# Patient Record
Sex: MALE | Race: BLACK OR AFRICAN AMERICAN | NOT HISPANIC OR LATINO | URBAN - METROPOLITAN AREA
[De-identification: names, ages, dates, MRNs, and addresses within clinical notes are randomized per-mention and may not be internally consistent; named-entity substitution may affect disease eponyms.]

---

## 2020-07-31 ENCOUNTER — OUTPATIENT (OUTPATIENT)
Dept: OUTPATIENT SERVICES | Facility: HOSPITAL | Age: 50
LOS: 1 days | Discharge: HOME | End: 2020-07-31
Payer: MEDICARE

## 2020-07-31 VITALS
RESPIRATION RATE: 14 BRPM | WEIGHT: 238.1 LBS | DIASTOLIC BLOOD PRESSURE: 76 MMHG | HEIGHT: 69 IN | OXYGEN SATURATION: 98 % | SYSTOLIC BLOOD PRESSURE: 128 MMHG | HEART RATE: 66 BPM | TEMPERATURE: 98 F

## 2020-07-31 DIAGNOSIS — S83.242D OTHER TEAR OF MEDIAL MENISCUS, CURRENT INJURY, LEFT KNEE, SUBSEQUENT ENCOUNTER: ICD-10-CM

## 2020-07-31 DIAGNOSIS — Z01.818 ENCOUNTER FOR OTHER PREPROCEDURAL EXAMINATION: ICD-10-CM

## 2020-07-31 DIAGNOSIS — I10 ESSENTIAL (PRIMARY) HYPERTENSION: ICD-10-CM

## 2020-07-31 DIAGNOSIS — Z98.890 OTHER SPECIFIED POSTPROCEDURAL STATES: Chronic | ICD-10-CM

## 2020-07-31 LAB
ANION GAP SERPL CALC-SCNC: 4 MMOL/L — LOW (ref 5–17)
BASOPHILS # BLD AUTO: 0.03 K/UL — SIGNIFICANT CHANGE UP (ref 0–0.2)
BASOPHILS NFR BLD AUTO: 0.6 % — SIGNIFICANT CHANGE UP (ref 0–2)
BUN SERPL-MCNC: 17 MG/DL — SIGNIFICANT CHANGE UP (ref 7–23)
CALCIUM SERPL-MCNC: 8.6 MG/DL — SIGNIFICANT CHANGE UP (ref 8.5–10.1)
CHLORIDE SERPL-SCNC: 108 MMOL/L — SIGNIFICANT CHANGE UP (ref 96–108)
CO2 SERPL-SCNC: 31 MMOL/L — SIGNIFICANT CHANGE UP (ref 22–31)
CREAT SERPL-MCNC: 1.13 MG/DL — SIGNIFICANT CHANGE UP (ref 0.5–1.3)
EOSINOPHIL # BLD AUTO: 0.11 K/UL — SIGNIFICANT CHANGE UP (ref 0–0.5)
EOSINOPHIL NFR BLD AUTO: 2.2 % — SIGNIFICANT CHANGE UP (ref 0–6)
GLUCOSE SERPL-MCNC: 114 MG/DL — HIGH (ref 70–99)
HCT VFR BLD CALC: 42.6 % — SIGNIFICANT CHANGE UP (ref 39–50)
HGB BLD-MCNC: 13.5 G/DL — SIGNIFICANT CHANGE UP (ref 13–17)
IMM GRANULOCYTES NFR BLD AUTO: 0.2 % — SIGNIFICANT CHANGE UP (ref 0–1.5)
LYMPHOCYTES # BLD AUTO: 1.86 K/UL — SIGNIFICANT CHANGE UP (ref 1–3.3)
LYMPHOCYTES # BLD AUTO: 37.7 % — SIGNIFICANT CHANGE UP (ref 13–44)
MCHC RBC-ENTMCNC: 26.7 PG — LOW (ref 27–34)
MCHC RBC-ENTMCNC: 31.7 GM/DL — LOW (ref 32–36)
MCV RBC AUTO: 84.2 FL — SIGNIFICANT CHANGE UP (ref 80–100)
MONOCYTES # BLD AUTO: 0.41 K/UL — SIGNIFICANT CHANGE UP (ref 0–0.9)
MONOCYTES NFR BLD AUTO: 8.3 % — SIGNIFICANT CHANGE UP (ref 2–14)
NEUTROPHILS # BLD AUTO: 2.51 K/UL — SIGNIFICANT CHANGE UP (ref 1.8–7.4)
NEUTROPHILS NFR BLD AUTO: 51 % — SIGNIFICANT CHANGE UP (ref 43–77)
NRBC # BLD: 0 /100 WBCS — SIGNIFICANT CHANGE UP (ref 0–0)
PLATELET # BLD AUTO: 242 K/UL — SIGNIFICANT CHANGE UP (ref 150–400)
POTASSIUM SERPL-MCNC: 3.4 MMOL/L — LOW (ref 3.5–5.3)
POTASSIUM SERPL-SCNC: 3.4 MMOL/L — LOW (ref 3.5–5.3)
RBC # BLD: 5.06 M/UL — SIGNIFICANT CHANGE UP (ref 4.2–5.8)
RBC # FLD: 13.9 % — SIGNIFICANT CHANGE UP (ref 10.3–14.5)
SODIUM SERPL-SCNC: 143 MMOL/L — SIGNIFICANT CHANGE UP (ref 135–145)
WBC # BLD: 4.93 K/UL — SIGNIFICANT CHANGE UP (ref 3.8–10.5)
WBC # FLD AUTO: 4.93 K/UL — SIGNIFICANT CHANGE UP (ref 3.8–10.5)

## 2020-07-31 PROCEDURE — 93010 ELECTROCARDIOGRAM REPORT: CPT

## 2020-07-31 RX ORDER — SODIUM CHLORIDE 9 MG/ML
3 INJECTION INTRAMUSCULAR; INTRAVENOUS; SUBCUTANEOUS EVERY 8 HOURS
Refills: 0 | Status: DISCONTINUED | OUTPATIENT
Start: 2020-07-31 | End: 2020-07-31

## 2020-07-31 RX ORDER — SODIUM CHLORIDE 9 MG/ML
1000 INJECTION, SOLUTION INTRAVENOUS
Refills: 0 | Status: DISCONTINUED | OUTPATIENT
Start: 2020-08-10 | End: 2020-08-25

## 2020-07-31 RX ORDER — SODIUM CHLORIDE 9 MG/ML
1000 INJECTION, SOLUTION INTRAVENOUS
Refills: 0 | Status: DISCONTINUED | OUTPATIENT
Start: 2020-07-31 | End: 2020-07-31

## 2020-07-31 RX ORDER — SODIUM CHLORIDE 9 MG/ML
3 INJECTION INTRAMUSCULAR; INTRAVENOUS; SUBCUTANEOUS EVERY 8 HOURS
Refills: 0 | Status: DISCONTINUED | OUTPATIENT
Start: 2020-08-10 | End: 2020-08-25

## 2020-07-31 NOTE — H&P PST ADULT - HISTORY OF PRESENT ILLNESS
51yo male with medical h/o work-related injury to left knee and pt presents today for PST for Left Knee Arthroscopy scheduled for 8/10/2020

## 2020-07-31 NOTE — H&P PST ADULT - NEGATIVE CARDIOVASCULAR SYMPTOMS
no peripheral edema/no chest pain/no palpitations/no paroxysmal nocturnal dyspnea/no claudication/no orthopnea/no dyspnea on exertion

## 2020-07-31 NOTE — H&P PST ADULT - NSANTHOSAYNRD_GEN_A_CORE
No. GRICEL screening performed.  STOP BANG Legend: 0-2 = LOW Risk; 3-4 = INTERMEDIATE Risk; 5-8 = HIGH Risk

## 2020-07-31 NOTE — H&P PST ADULT - NSICDXFAMILYHX_GEN_ALL_CORE_FT
FAMILY HISTORY:  Mother  Still living? Yes, Estimated age: Age Unknown  Family history of cancer, Age at diagnosis: Age Unknown  Family history of diabetes mellitus, Age at diagnosis: Age Unknown    Sibling  Still living? Unknown  Family history of cancer, Age at diagnosis: Age Unknown  Family history of diabetes mellitus, Age at diagnosis: Age Unknown

## 2020-07-31 NOTE — H&P PST ADULT - NEGATIVE MUSCULOSKELETAL SYMPTOMS
no joint swelling/no arthralgia/no arm pain L/no arm pain R/no muscle weakness/no neck pain/no myalgia/no muscle cramps/no leg pain R

## 2020-07-31 NOTE — H&P PST ADULT - MUSCULOSKELETAL
details… detailed exam diminished strength/no joint swelling/no joint erythema/no calf tenderness/decreased ROM due to pain/no joint warmth

## 2020-07-31 NOTE — H&P PST ADULT - NSICDXPROBLEM_GEN_ALL_CORE_FT
PROBLEM DIAGNOSES  Problem: Other tear of medial meniscus, current injury, left knee, subsequent encounter  Assessment and Plan: Pre-op instructions given. Pt verbalized understanding  Chlorhexidine wash instructions given  Pending: Covid19 results, test scheduled for 8/7    Problem: Hypertension  Assessment and Plan: GRICEL precaution - stop bang 3

## 2020-08-07 ENCOUNTER — OUTPATIENT (OUTPATIENT)
Dept: OUTPATIENT SERVICES | Facility: HOSPITAL | Age: 50
LOS: 1 days | Discharge: HOME | End: 2020-08-07

## 2020-08-07 DIAGNOSIS — Z01.818 ENCOUNTER FOR OTHER PREPROCEDURAL EXAMINATION: ICD-10-CM

## 2020-08-07 DIAGNOSIS — Z98.890 OTHER SPECIFIED POSTPROCEDURAL STATES: Chronic | ICD-10-CM

## 2020-08-07 LAB — SARS-COV-2 RNA SPEC QL NAA+PROBE: SIGNIFICANT CHANGE UP

## 2020-08-09 ENCOUNTER — TRANSCRIPTION ENCOUNTER (OUTPATIENT)
Age: 50
End: 2020-08-09

## 2020-08-10 ENCOUNTER — OUTPATIENT (OUTPATIENT)
Dept: OUTPATIENT SERVICES | Facility: HOSPITAL | Age: 50
LOS: 1 days | Discharge: HOME | End: 2020-08-10

## 2020-08-10 VITALS
DIASTOLIC BLOOD PRESSURE: 74 MMHG | SYSTOLIC BLOOD PRESSURE: 126 MMHG | TEMPERATURE: 98 F | RESPIRATION RATE: 14 BRPM | HEART RATE: 75 BPM | OXYGEN SATURATION: 96 %

## 2020-08-10 VITALS
HEIGHT: 69 IN | DIASTOLIC BLOOD PRESSURE: 87 MMHG | TEMPERATURE: 99 F | HEART RATE: 74 BPM | RESPIRATION RATE: 18 BRPM | WEIGHT: 238.1 LBS | SYSTOLIC BLOOD PRESSURE: 130 MMHG | OXYGEN SATURATION: 97 %

## 2020-08-10 DIAGNOSIS — Z98.890 OTHER SPECIFIED POSTPROCEDURAL STATES: Chronic | ICD-10-CM

## 2020-08-10 RX ORDER — SODIUM CHLORIDE 9 MG/ML
1000 INJECTION, SOLUTION INTRAVENOUS
Refills: 0 | Status: DISCONTINUED | OUTPATIENT
Start: 2020-08-10 | End: 2020-08-10

## 2020-08-10 RX ORDER — ONDANSETRON 8 MG/1
1 TABLET, FILM COATED ORAL
Qty: 10 | Refills: 0
Start: 2020-08-10 | End: 2020-08-12

## 2020-08-10 RX ORDER — HYDROMORPHONE HYDROCHLORIDE 2 MG/ML
0.5 INJECTION INTRAMUSCULAR; INTRAVENOUS; SUBCUTANEOUS
Refills: 0 | Status: DISCONTINUED | OUTPATIENT
Start: 2020-08-10 | End: 2020-08-10

## 2020-08-10 RX ORDER — OXYCODONE AND ACETAMINOPHEN 5; 325 MG/1; MG/1
1 TABLET ORAL
Qty: 20 | Refills: 0
Start: 2020-08-10 | End: 2020-08-12

## 2020-08-10 RX ADMIN — SODIUM CHLORIDE 30 MILLILITER(S): 9 INJECTION, SOLUTION INTRAVENOUS at 17:23

## 2020-08-10 NOTE — ASU DISCHARGE PLAN (ADULT/PEDIATRIC) - CARE PROVIDER_API CALL
Hardeep Alvarado  ORTHOPAEDIC SURGERY  [address] [place] [address]  Phone: [phone]  Fax: [phone]  Follow Up Time:

## 2020-08-10 NOTE — ASU DISCHARGE PLAN (ADULT/PEDIATRIC) - ASU DC SPECIAL INSTRUCTIONSFT
Knee Arthroscopy Instructions    1) Your knee will swell over the next 48-72 hours and you can expect pain to get a bit worse. Ice your knee plenty, continuously if possible. Fill up a plastic bag, then wrap it in a towel or pillow case.     2) Elevate your leg above your heart with about 3 pillows when you can, when you are in bed or chair. Otherwise you should be up and about, walking as much as you can tolerate. The more you move the better you will do. Weight bearing as tolerated.    3) BANDAGE: Expect some mild bloody drainage. It is normal. It may soak through the gauze and ACE bandage. This is mostly leftover Saline fluid coming out of your knee from surgery. Just place another gauze and ACE over it and wrap it snug but not overly tight. If it bleeds through the second bandage again, call.    4) SHOWER: Remove bandage in 72 hours and place waterproof band aides. You can shower in 72 hours. No bath. Pat your incisions dry. No creams or lotions.    5) Only reason to worry would be if pain got so severe that you cannot feel or wiggle your toes, or if your foot is cold. In this case you need to call or go to the Emergency Room. But as long as you can feel and wiggle your toes, you are fine.    6) Call the office to schedule a follow up appointment to be seen in 7-10 days. Your Sutures will be removed at that point.    7) A pain Rx is in the chart; fill it on your way home. OR was sent electronically to your pharmacy, pick it up on the way home.

## 2020-08-14 DIAGNOSIS — M67.52 PLICA SYNDROME, LEFT KNEE: ICD-10-CM

## 2020-08-14 DIAGNOSIS — I10 ESSENTIAL (PRIMARY) HYPERTENSION: ICD-10-CM

## 2020-08-14 DIAGNOSIS — M23.232 DERANGEMENT OF OTHER MEDIAL MENISCUS DUE TO OLD TEAR OR INJURY, LEFT KNEE: ICD-10-CM

## 2020-08-14 DIAGNOSIS — M94.262 CHONDROMALACIA, LEFT KNEE: ICD-10-CM

## 2020-12-04 ENCOUNTER — APPOINTMENT (OUTPATIENT)
Dept: ORTHOPEDIC SURGERY | Facility: CLINIC | Age: 50
End: 2020-12-04

## 2020-12-04 PROBLEM — L80 VITILIGO: Chronic | Status: ACTIVE | Noted: 2020-08-10

## 2020-12-07 ENCOUNTER — APPOINTMENT (OUTPATIENT)
Dept: ORTHOPEDIC SURGERY | Facility: CLINIC | Age: 50
End: 2020-12-07
Payer: COMMERCIAL

## 2020-12-07 VITALS — WEIGHT: 238 LBS | BODY MASS INDEX: 35.25 KG/M2 | HEIGHT: 69 IN

## 2020-12-07 DIAGNOSIS — Z80.0 FAMILY HISTORY OF MALIGNANT NEOPLASM OF DIGESTIVE ORGANS: ICD-10-CM

## 2020-12-07 DIAGNOSIS — Z78.9 OTHER SPECIFIED HEALTH STATUS: ICD-10-CM

## 2020-12-07 DIAGNOSIS — Z80.49 FAMILY HISTORY OF MALIGNANT NEOPLASM OF OTHER GENITAL ORGANS: ICD-10-CM

## 2020-12-07 DIAGNOSIS — M51.36 OTHER INTERVERTEBRAL DISC DEGENERATION, LUMBAR REGION: ICD-10-CM

## 2020-12-07 DIAGNOSIS — Z80.1 FAMILY HISTORY OF MALIGNANT NEOPLASM OF TRACHEA, BRONCHUS AND LUNG: ICD-10-CM

## 2020-12-07 DIAGNOSIS — M47.817 SPONDYLOSIS W/OUT MYELOPATHY OR RADICULOPATHY, LUMBOSACRAL REGION: ICD-10-CM

## 2020-12-07 DIAGNOSIS — Z86.79 PERSONAL HISTORY OF OTHER DISEASES OF THE CIRCULATORY SYSTEM: ICD-10-CM

## 2020-12-07 PROCEDURE — 99214 OFFICE O/P EST MOD 30 MIN: CPT

## 2020-12-07 PROCEDURE — 99072 ADDL SUPL MATRL&STAF TM PHE: CPT

## 2020-12-07 PROCEDURE — 72100 X-RAY EXAM L-S SPINE 2/3 VWS: CPT

## 2020-12-07 RX ORDER — OXYCODONE AND ACETAMINOPHEN 5; 325 MG/1; MG/1
5-325 TABLET ORAL
Qty: 20 | Refills: 0 | Status: ACTIVE | COMMUNITY
Start: 2020-09-02

## 2020-12-07 RX ORDER — NAPROXEN 500 MG/1
500 TABLET ORAL
Qty: 20 | Refills: 0 | Status: ACTIVE | COMMUNITY
Start: 2020-09-02

## 2020-12-07 RX ORDER — MELOXICAM 15 MG/1
15 TABLET ORAL
Qty: 30 | Refills: 1 | Status: ACTIVE | COMMUNITY
Start: 2020-12-07 | End: 1900-01-01

## 2020-12-22 ENCOUNTER — APPOINTMENT (OUTPATIENT)
Dept: ORTHOPEDIC SURGERY | Facility: CLINIC | Age: 50
End: 2020-12-22
Payer: COMMERCIAL

## 2020-12-22 VITALS — TEMPERATURE: 97.3 F

## 2020-12-22 DIAGNOSIS — M51.26 OTHER INTERVERTEBRAL DISC DISPLACEMENT, LUMBAR REGION: ICD-10-CM

## 2020-12-22 PROCEDURE — 99214 OFFICE O/P EST MOD 30 MIN: CPT

## 2020-12-22 PROCEDURE — 99072 ADDL SUPL MATRL&STAF TM PHE: CPT

## 2021-01-13 DIAGNOSIS — Z00.00 ENCOUNTER FOR GENERAL ADULT MEDICAL EXAMINATION W/OUT ABNORMAL FINDINGS: ICD-10-CM

## 2021-01-14 ENCOUNTER — APPOINTMENT (OUTPATIENT)
Dept: ORTHOPEDIC SURGERY | Facility: CLINIC | Age: 51
End: 2021-01-14
Payer: COMMERCIAL

## 2021-01-14 DIAGNOSIS — M70.62 TROCHANTERIC BURSITIS, LEFT HIP: ICD-10-CM

## 2021-01-14 PROCEDURE — 99072 ADDL SUPL MATRL&STAF TM PHE: CPT

## 2021-01-14 PROCEDURE — 99214 OFFICE O/P EST MOD 30 MIN: CPT

## 2021-01-14 NOTE — DISCUSSION/SUMMARY
[de-identified] : The patient has GTB of the left hip. They are not an appropriate candidate for surgical intervention at this time. An extensive discussion was conducted on the natural history of the disease and the variety of surgical and non-surgical options available to the patient including, but not limited to non-steroidal anti-inflammatory medications, steroid injections, physical therapy, maintenance of ideal body weight, and reduction of activity. I recommended and prescribed a course of Mobic and physical therapy. The patient will schedule an appointment as needed. He may return to work, full duty without restrictions with regards to his left hip.

## 2021-01-14 NOTE — HISTORY OF PRESENT ILLNESS
[de-identified] : This is a very nice  50 year old  male experiencing pain in the lateral aspect of the left hip. He denies groin pain.  He did have a workers comp injury to the left knee which was treated by Dr. FERNANDES which is mild in intensity and has been going on for months now. He is also being treated by Dr. Ritter for his back for lumbar degenerative disc disease. He was treated with an epidural lumbar injection which he states is helping the hip. He works for the [employer] as is hoping to be cleared back to work.  The patient denies any radiation of the pain to the feet and it is not associated with numbness, tingling, or weakness.  Does not require cane or walker for ambulation.  Has not tried physical therapy for this.

## 2021-01-14 NOTE — PHYSICAL EXAM
[de-identified] : Patient is well nourished, well-developed, in no acute distress, with appropriate mood and affect. The patient is oriented to time, place, and person. Respirations are even and unlabored. Gait evaluation does not reveal a limp. There is no inguinal adenopathy. Examination of the contralateral hip shows normal range of motion, strength, no tenderness, and intact skin. The affected limb is well-perfused and showed 2+ dp/pt pulses, without skin lesions, shows a grossly normal motor and sensory examination. Examination of the hip shows no skin lesions. Hip motion is full and painless throughout ROM. Leg lengths are approximately equal. FADIR is negative and MARY is negative. Stinchfield test is negative. Both hips are stable and muscle strength is normal with good strength with resisted abduction and adduction. Pedal pulses are palpable.  Tender to palpation over the lateral aspect of the hip. [de-identified] : AP and lateral x-rays of the left hip, pelvis, and femur were brought in by the patient and does not show any arthritic changes to the joint. There is satisfactory joint space. There is a small calcification noted at the superior acetabular rim.

## 2021-04-08 ENCOUNTER — TRANSCRIPTION ENCOUNTER (OUTPATIENT)
Age: 51
End: 2021-04-08

## 2021-04-10 ENCOUNTER — INPATIENT (INPATIENT)
Facility: HOSPITAL | Age: 51
LOS: 3 days | Discharge: HOME | End: 2021-04-14
Attending: INTERNAL MEDICINE | Admitting: INTERNAL MEDICINE
Payer: COMMERCIAL

## 2021-04-10 VITALS
HEART RATE: 80 BPM | HEIGHT: 69 IN | WEIGHT: 179.9 LBS | SYSTOLIC BLOOD PRESSURE: 118 MMHG | DIASTOLIC BLOOD PRESSURE: 74 MMHG | RESPIRATION RATE: 19 BRPM | OXYGEN SATURATION: 99 %

## 2021-04-10 DIAGNOSIS — Z98.890 OTHER SPECIFIED POSTPROCEDURAL STATES: Chronic | ICD-10-CM

## 2021-04-10 LAB
ALBUMIN SERPL ELPH-MCNC: 3.3 G/DL — SIGNIFICANT CHANGE UP (ref 3.3–5)
ALP SERPL-CCNC: 66 U/L — SIGNIFICANT CHANGE UP (ref 40–120)
ALT FLD-CCNC: 22 U/L — SIGNIFICANT CHANGE UP (ref 12–78)
ANION GAP SERPL CALC-SCNC: 7 MMOL/L — SIGNIFICANT CHANGE UP (ref 5–17)
APPEARANCE UR: CLEAR — SIGNIFICANT CHANGE UP
AST SERPL-CCNC: 26 U/L — SIGNIFICANT CHANGE UP (ref 15–37)
BACTERIA # UR AUTO: ABNORMAL
BASOPHILS # BLD AUTO: 0.01 K/UL — SIGNIFICANT CHANGE UP (ref 0–0.2)
BASOPHILS NFR BLD AUTO: 0.2 % — SIGNIFICANT CHANGE UP (ref 0–2)
BILIRUB SERPL-MCNC: 0.5 MG/DL — SIGNIFICANT CHANGE UP (ref 0.2–1.2)
BILIRUB UR-MCNC: NEGATIVE — SIGNIFICANT CHANGE UP
BUN SERPL-MCNC: 15 MG/DL — SIGNIFICANT CHANGE UP (ref 7–23)
CALCIUM SERPL-MCNC: 8 MG/DL — LOW (ref 8.5–10.1)
CHLORIDE SERPL-SCNC: 100 MMOL/L — SIGNIFICANT CHANGE UP (ref 96–108)
CK MB BLD-MCNC: <1 % — SIGNIFICANT CHANGE UP (ref 0–3.5)
CK MB CFR SERPL CALC: <1 NG/ML — SIGNIFICANT CHANGE UP (ref 0.5–3.6)
CK SERPL-CCNC: 100 U/L — SIGNIFICANT CHANGE UP (ref 26–308)
CO2 SERPL-SCNC: 33 MMOL/L — HIGH (ref 22–31)
COLOR SPEC: YELLOW — SIGNIFICANT CHANGE UP
CREAT SERPL-MCNC: 1.21 MG/DL — SIGNIFICANT CHANGE UP (ref 0.5–1.3)
D DIMER BLD IA.RAPID-MCNC: 254 NG/ML DDU — HIGH
DIFF PNL FLD: NEGATIVE — SIGNIFICANT CHANGE UP
EOSINOPHIL # BLD AUTO: 0 K/UL — SIGNIFICANT CHANGE UP (ref 0–0.5)
EOSINOPHIL NFR BLD AUTO: 0 % — SIGNIFICANT CHANGE UP (ref 0–6)
EPI CELLS # UR: SIGNIFICANT CHANGE UP
GLUCOSE SERPL-MCNC: 122 MG/DL — HIGH (ref 70–99)
GLUCOSE UR QL: NEGATIVE MG/DL — SIGNIFICANT CHANGE UP
HCT VFR BLD CALC: 40.5 % — SIGNIFICANT CHANGE UP (ref 39–50)
HGB BLD-MCNC: 12.9 G/DL — LOW (ref 13–17)
IMM GRANULOCYTES NFR BLD AUTO: 0.2 % — SIGNIFICANT CHANGE UP (ref 0–1.5)
INR BLD: 1.15 RATIO — SIGNIFICANT CHANGE UP (ref 0.88–1.16)
KETONES UR-MCNC: ABNORMAL
LEUKOCYTE ESTERASE UR-ACNC: NEGATIVE — SIGNIFICANT CHANGE UP
LYMPHOCYTES # BLD AUTO: 0.73 K/UL — LOW (ref 1–3.3)
LYMPHOCYTES # BLD AUTO: 16.3 % — SIGNIFICANT CHANGE UP (ref 13–44)
MCHC RBC-ENTMCNC: 26.8 PG — LOW (ref 27–34)
MCHC RBC-ENTMCNC: 31.9 GM/DL — LOW (ref 32–36)
MCV RBC AUTO: 84 FL — SIGNIFICANT CHANGE UP (ref 80–100)
MONOCYTES # BLD AUTO: 0.31 K/UL — SIGNIFICANT CHANGE UP (ref 0–0.9)
MONOCYTES NFR BLD AUTO: 6.9 % — SIGNIFICANT CHANGE UP (ref 2–14)
NEUTROPHILS # BLD AUTO: 3.42 K/UL — SIGNIFICANT CHANGE UP (ref 1.8–7.4)
NEUTROPHILS NFR BLD AUTO: 76.4 % — SIGNIFICANT CHANGE UP (ref 43–77)
NITRITE UR-MCNC: NEGATIVE — SIGNIFICANT CHANGE UP
NRBC # BLD: 0 /100 WBCS — SIGNIFICANT CHANGE UP (ref 0–0)
PH UR: 6.5 — SIGNIFICANT CHANGE UP (ref 5–8)
PLATELET # BLD AUTO: 125 K/UL — LOW (ref 150–400)
POTASSIUM SERPL-MCNC: 3.1 MMOL/L — LOW (ref 3.5–5.3)
POTASSIUM SERPL-SCNC: 3.1 MMOL/L — LOW (ref 3.5–5.3)
PROT SERPL-MCNC: 6.6 GM/DL — SIGNIFICANT CHANGE UP (ref 6–8.3)
PROT UR-MCNC: 30 MG/DL
PROTHROM AB SERPL-ACNC: 13.3 SEC — SIGNIFICANT CHANGE UP (ref 10.6–13.6)
RAPID RVP RESULT: DETECTED
RBC # BLD: 4.82 M/UL — SIGNIFICANT CHANGE UP (ref 4.2–5.8)
RBC # FLD: 13.2 % — SIGNIFICANT CHANGE UP (ref 10.3–14.5)
RBC CASTS # UR COMP ASSIST: SIGNIFICANT CHANGE UP /HPF (ref 0–4)
SARS-COV-2 RNA SPEC QL NAA+PROBE: DETECTED
SODIUM SERPL-SCNC: 140 MMOL/L — SIGNIFICANT CHANGE UP (ref 135–145)
SP GR SPEC: 1.01 — SIGNIFICANT CHANGE UP (ref 1.01–1.02)
TROPONIN I SERPL-MCNC: <.015 NG/ML — SIGNIFICANT CHANGE UP (ref 0.01–0.04)
UROBILINOGEN FLD QL: 4 MG/DL
WBC # BLD: 4.48 K/UL — SIGNIFICANT CHANGE UP (ref 3.8–10.5)
WBC # FLD AUTO: 4.48 K/UL — SIGNIFICANT CHANGE UP (ref 3.8–10.5)
WBC UR QL: ABNORMAL

## 2021-04-10 PROCEDURE — 71045 X-RAY EXAM CHEST 1 VIEW: CPT | Mod: 26

## 2021-04-10 PROCEDURE — 93010 ELECTROCARDIOGRAM REPORT: CPT

## 2021-04-10 PROCEDURE — 99285 EMERGENCY DEPT VISIT HI MDM: CPT

## 2021-04-10 RX ORDER — SODIUM CHLORIDE 9 MG/ML
1000 INJECTION INTRAMUSCULAR; INTRAVENOUS; SUBCUTANEOUS ONCE
Refills: 0 | Status: COMPLETED | OUTPATIENT
Start: 2021-04-10 | End: 2021-04-10

## 2021-04-10 NOTE — ED PROVIDER NOTE - ENMT, MLM
Airway patent, Nasal mucosa clear. Mouth with normal mucosa. Throat has no vesicles, no oropharyngeal exudates and uvula is midline. Airway patent, mouth with normal mucosa

## 2021-04-10 NOTE — ED ADULT NURSE NOTE - OBJECTIVE STATEMENT
Pt axox3 presents to the ED complaining of syncopal episode at home. Pt states that he passed out for about 15-20 seconds, not witnessed by family but heard. Pt not sure/doesn't remember if he hit his head. Denies eating the whole day. Denies headache, SOB, chest pain. Hx HTN. Denies etoh, smoking.

## 2021-04-10 NOTE — ED PROVIDER NOTE - OBJECTIVE STATEMENT
Pt is a 50 year old male w/PMH of HTN left knee and right shoulder surgery, who presents to the ED today s/p syncope. Pt was diagnosed COVID positive on Thursday from his daughter. Pt was walking to the fridge to get water feeling a little dizzy, and passed out. Per wife she heard him fall and saw that was propped up against the door when she found him. Pt looked as if he was having a seizure, and his upper body  was trembling. Pt was able to recognized his wife after a minute. Pt passed out again when EMS had arrived. Pt reports he has not eaten properly or drank anything since. Pt c/o chills and coughs. He took Vilma seltzer plus. Denies headaches, head strike, body aches, neck pain, CP, or abdominal pain. Patient denies tobacco/illicit substance use. Pt drinks occasionally. Pt is a 50 year old male w/PMH of HTN left knee and right shoulder surgery, who presents to the ED today s/p syncopal episode. Pt was diagnosed COVID  on Thursday. Pt recalls walking to the fridge to get water, felt dizzy, and passed out. Per wife she heard him fall and saw that was propped up against the door when she found him with his eyes closed. Pt upper body was trembling and appeared to be having a ?seizure. Pt was able to recognized his wife after a minute. Pt passed out again witnessed by EMS, Pt reports he has not eaten properly or drinking fluids x 3 days. Pt c/o chills coughs and weakness, pt denies headaches, head injury, body aches, neck pain, CP, or abdominal pain. Patient denies tobacco/illicit substance use.

## 2021-04-10 NOTE — ED ADULT TRIAGE NOTE - CHIEF COMPLAINT QUOTE
Patient BIBA after patient had syncopal episode at 18.00. Patient tested positive for covid 4/8/21. Curently alert and oriented x3, Patient received a litre of fluid in the field as his bp was low . HX of HTN

## 2021-04-10 NOTE — ED PROVIDER NOTE - CLINICAL SUMMARY MEDICAL DECISION MAKING FREE TEXT BOX
pt presented s/p syncopal episode, ?seizure activitiy witnessed by his wife, recently found to be covid positive two days ago  COVID workup, ct head/angio, dispo pending results

## 2021-04-10 NOTE — ED PROVIDER NOTE - CARE PLAN
Principal Discharge DX:	Syncope and collapse  Secondary Diagnosis:	COVID-19   Principal Discharge DX:	Syncope and collapse  Secondary Diagnosis:	COVID-19  Secondary Diagnosis:	Hypoxia

## 2021-04-11 DIAGNOSIS — I10 ESSENTIAL (PRIMARY) HYPERTENSION: ICD-10-CM

## 2021-04-11 DIAGNOSIS — R55 SYNCOPE AND COLLAPSE: ICD-10-CM

## 2021-04-11 DIAGNOSIS — Z29.9 ENCOUNTER FOR PROPHYLACTIC MEASURES, UNSPECIFIED: ICD-10-CM

## 2021-04-11 DIAGNOSIS — R09.02 HYPOXEMIA: ICD-10-CM

## 2021-04-11 DIAGNOSIS — U07.1 COVID-19: ICD-10-CM

## 2021-04-11 LAB
ALBUMIN SERPL ELPH-MCNC: 3 G/DL — LOW (ref 3.3–5)
ALP SERPL-CCNC: 60 U/L — SIGNIFICANT CHANGE UP (ref 40–120)
ALT FLD-CCNC: 23 U/L — SIGNIFICANT CHANGE UP (ref 12–78)
ANION GAP SERPL CALC-SCNC: 2 MMOL/L — LOW (ref 5–17)
AST SERPL-CCNC: 17 U/L — SIGNIFICANT CHANGE UP (ref 15–37)
BILIRUB DIRECT SERPL-MCNC: 0.13 MG/DL — SIGNIFICANT CHANGE UP (ref 0.05–0.2)
BILIRUB SERPL-MCNC: 0.4 MG/DL — SIGNIFICANT CHANGE UP (ref 0.2–1.2)
BUN SERPL-MCNC: 13 MG/DL — SIGNIFICANT CHANGE UP (ref 7–23)
CALCIUM SERPL-MCNC: 7.6 MG/DL — LOW (ref 8.5–10.1)
CHLORIDE SERPL-SCNC: 105 MMOL/L — SIGNIFICANT CHANGE UP (ref 96–108)
CO2 SERPL-SCNC: 34 MMOL/L — HIGH (ref 22–31)
CREAT SERPL-MCNC: 1.17 MG/DL — SIGNIFICANT CHANGE UP (ref 0.5–1.3)
CRP SERPL-MCNC: 57 MG/L — HIGH
FERRITIN SERPL-MCNC: 731 NG/ML — HIGH (ref 30–400)
GLUCOSE SERPL-MCNC: 127 MG/DL — HIGH (ref 70–99)
INR BLD: 1.19 RATIO — HIGH (ref 0.88–1.16)
MAGNESIUM SERPL-MCNC: 2.2 MG/DL — SIGNIFICANT CHANGE UP (ref 1.6–2.6)
PHOSPHATE SERPL-MCNC: 2.6 MG/DL — SIGNIFICANT CHANGE UP (ref 2.5–4.5)
POTASSIUM SERPL-MCNC: 3.4 MMOL/L — LOW (ref 3.5–5.3)
POTASSIUM SERPL-SCNC: 3.4 MMOL/L — LOW (ref 3.5–5.3)
PROCALCITONIN SERPL-MCNC: 0.08 NG/ML — SIGNIFICANT CHANGE UP (ref 0.02–0.1)
PROT SERPL-MCNC: 6.3 GM/DL — SIGNIFICANT CHANGE UP (ref 6–8.3)
PROTHROM AB SERPL-ACNC: 13.7 SEC — HIGH (ref 10.6–13.6)
SODIUM SERPL-SCNC: 141 MMOL/L — SIGNIFICANT CHANGE UP (ref 135–145)

## 2021-04-11 PROCEDURE — 70450 CT HEAD/BRAIN W/O DYE: CPT | Mod: 26,MA

## 2021-04-11 PROCEDURE — 99223 1ST HOSP IP/OBS HIGH 75: CPT

## 2021-04-11 PROCEDURE — 71275 CT ANGIOGRAPHY CHEST: CPT | Mod: 26,MA

## 2021-04-11 PROCEDURE — 12345: CPT | Mod: NC

## 2021-04-11 RX ORDER — ACETAMINOPHEN 500 MG
650 TABLET ORAL EVERY 6 HOURS
Refills: 0 | Status: DISCONTINUED | OUTPATIENT
Start: 2021-04-11 | End: 2021-04-14

## 2021-04-11 RX ORDER — SODIUM CHLORIDE 9 MG/ML
1000 INJECTION INTRAMUSCULAR; INTRAVENOUS; SUBCUTANEOUS
Refills: 0 | Status: DISCONTINUED | OUTPATIENT
Start: 2021-04-11 | End: 2021-04-13

## 2021-04-11 RX ORDER — POTASSIUM CHLORIDE 20 MEQ
40 PACKET (EA) ORAL ONCE
Refills: 0 | Status: COMPLETED | OUTPATIENT
Start: 2021-04-11 | End: 2021-04-11

## 2021-04-11 RX ORDER — POTASSIUM CHLORIDE 20 MEQ
40 PACKET (EA) ORAL EVERY 4 HOURS
Refills: 0 | Status: COMPLETED | OUTPATIENT
Start: 2021-04-11 | End: 2021-04-11

## 2021-04-11 RX ORDER — REMDESIVIR 5 MG/ML
INJECTION INTRAVENOUS
Refills: 0 | Status: DISCONTINUED | OUTPATIENT
Start: 2021-04-11 | End: 2021-04-14

## 2021-04-11 RX ORDER — DEXAMETHASONE 0.5 MG/5ML
6 ELIXIR ORAL DAILY
Refills: 0 | Status: DISCONTINUED | OUTPATIENT
Start: 2021-04-11 | End: 2021-04-14

## 2021-04-11 RX ORDER — REMDESIVIR 5 MG/ML
200 INJECTION INTRAVENOUS EVERY 24 HOURS
Refills: 0 | Status: COMPLETED | OUTPATIENT
Start: 2021-04-11 | End: 2021-04-11

## 2021-04-11 RX ORDER — ENOXAPARIN SODIUM 100 MG/ML
40 INJECTION SUBCUTANEOUS DAILY
Refills: 0 | Status: DISCONTINUED | OUTPATIENT
Start: 2021-04-11 | End: 2021-04-14

## 2021-04-11 RX ORDER — REMDESIVIR 5 MG/ML
100 INJECTION INTRAVENOUS EVERY 24 HOURS
Refills: 0 | Status: DISCONTINUED | OUTPATIENT
Start: 2021-04-12 | End: 2021-04-14

## 2021-04-11 RX ADMIN — SODIUM CHLORIDE 1000 MILLILITER(S): 9 INJECTION INTRAMUSCULAR; INTRAVENOUS; SUBCUTANEOUS at 03:07

## 2021-04-11 RX ADMIN — Medication 40 MILLIEQUIVALENT(S): at 16:57

## 2021-04-11 RX ADMIN — REMDESIVIR 200 MILLIGRAM(S): 5 INJECTION INTRAVENOUS at 13:48

## 2021-04-11 RX ADMIN — Medication 40 MILLIEQUIVALENT(S): at 11:00

## 2021-04-11 RX ADMIN — Medication 40 MILLIEQUIVALENT(S): at 08:38

## 2021-04-11 RX ADMIN — Medication 40 MILLIEQUIVALENT(S): at 17:53

## 2021-04-11 RX ADMIN — Medication 650 MILLIGRAM(S): at 10:59

## 2021-04-11 RX ADMIN — SODIUM CHLORIDE 1000 MILLILITER(S): 9 INJECTION INTRAMUSCULAR; INTRAVENOUS; SUBCUTANEOUS at 06:05

## 2021-04-11 RX ADMIN — Medication 6 MILLIGRAM(S): at 18:28

## 2021-04-11 RX ADMIN — ENOXAPARIN SODIUM 40 MILLIGRAM(S): 100 INJECTION SUBCUTANEOUS at 11:01

## 2021-04-11 NOTE — H&P ADULT - PROBLEM SELECTOR PLAN 1
admit to tele  likely component from poor po intake and hypotension from bp meds  pt received fluid in ed,   encourage po intake

## 2021-04-11 NOTE — H&P ADULT - HISTORY OF PRESENT ILLNESS
Pt is a 49 y/o male w/pmhx of htn and known covid diagnosed 3 days ago w/poor po intake over this time, was going to get something from fridge and passed out. Wife heard a fall and saw pt w/eyes closed next to fridge door.  pt w/intermittent fever, no sob, cp, palpitations, +n/-v/-d/-c +generalized malaise and fatigue poor appetite and po intake over this time.  + other family members w/covid.  in ed pt has been sating low to mid 90's, had a cta negative for PE and shows covid pna.

## 2021-04-11 NOTE — H&P ADULT - PROBLEM SELECTOR PLAN 2
isolation  start remdeisivirgiven short duration of sx's  start dexamethasone w/hypoxia  pulm consult

## 2021-04-11 NOTE — H&P ADULT - NSHPLABSRESULTS_GEN_ALL_CORE
LABS:                        12.9   4.48  )-----------( 125      ( 10 Apr 2021 21:10 )             40.5     04-10    140  |  100  |  15  ----------------------------<  122<H>  3.1<L>   |  33<H>  |  1.21    Ca    8.0<L>      10 Apr 2021 21:10    TPro  6.6  /  Alb  3.3  /  TBili  0.5  /  DBili  x   /  AST  26  /  ALT  22  /  AlkPhos  66  04-10    PT/INR - ( 10 Apr 2021 21:10 )   PT: 13.3 sec;   INR: 1.15 ratio           Urinalysis Basic - ( 10 Apr 2021 21:18 )    Color: Yellow / Appearance: Clear / S.015 / pH: x  Gluc: x / Ketone: Small  / Bili: Negative / Urobili: 4 mg/dL   Blood: x / Protein: 30 mg/dL / Nitrite: Negative   Leuk Esterase: Negative / RBC: 0-2 /HPF / WBC 6-10   Sq Epi: x / Non Sq Epi: Few / Bacteria: Many      CAPILLARY BLOOD GLUCOSE            RADIOLOGY & ADDITIONAL TESTS:    Imaging Personally Reviewed:  [ X] YES  [ ] NO

## 2021-04-11 NOTE — CONSULT NOTE ADULT - SUBJECTIVE AND OBJECTIVE BOX
Patient is a 50y old  Male who presents with a chief complaint of syncope (2021 17:41)    HPI:  51 y/o male w/pmhx of htn and known covid diagnosed 3 days ago w/poor po intake over this time, was going to get something from fridge and passed out. Wife heard a fall and saw pt w/eyes closed next to fridge door.  pt w/intermittent fever, no sob, cp, palpitations, +n/-v/-d/-c +generalized malaise and fatigue poor appetite and po intake over this time.  + other family members w/covid.  in ed pt has been sating low to mid 90's, had a cta negative for PE and shows covid pna. (2021 06:02).  Admitted with low BP and COVID pneumonia.    PAST MEDICAL & SURGICAL HISTORY:  Hypertension    Obese    Vitiligo  hands arms and legs    H/O shoulder surgery  labrum surgery 2015    FAMILY HISTORY:  Family history of diabetes mellitus (Mother, Sibling)    Family history of cancer (Mother, Sibling)    SOCIAL HISTORY: non smoker    Allergies  No Known Allergies    MEDICATIONS  (STANDING):  dexAMETHasone  Injectable 6 milliGRAM(s) IV Push daily  enoxaparin Injectable 40 milliGRAM(s) SubCutaneous daily  remdesivir  IVPB   IV Intermittent     MEDICATIONS  (PRN):  acetaminophen   Tablet .. 650 milliGRAM(s) Oral every 6 hours PRN Temp greater or equal to 38C (100.4F), Mild Pain (1 - 3)    Vital Signs Last 24 Hrs  T(C): 37 (2021 17:11), Max: 38.9 (2021 10:57)  T(F): 98.6 (2021 17:11), Max: 102.1 (2021 10:57)  HR: 87 (2021 17:11) (67 - 97)  BP: 124/80 (2021 17:11) (113/63 - 135/84)  BP(mean): --  RR: 19 (2021 17:11) (18 - 23)  SpO2: 94% (2021 17:11) (91% - 98%)    PHYSICAL EXAM:  GEN:         Awake, responsive and comfortable.  HEENT:    Normal.    RESP:       no distress  CVS:             Regular rate and rhythm.   ABD:         Soft, non-tender, non-distended;   SKIN:           Warm and dry.  EXTR:            No clubbing, cyanosis or edema  CNS:              Intact sensory and motor function.  PSYCH:        cooperative, no anxiety or depression    LABS:                        12.9   4.48  )-----------( 125      ( 10 Apr 2021 21:10 )             40.5     04-11    141  |  105  |  13  ----------------------------<  127<H>  3.4<L>   |  34<H>  |  1.17    Ca    7.6<L>      2021 11:16  Phos  2.6     04-  Mg     2.2     -11    TPro  6.3  /  Alb  3.0<L>  /  TBili  0.4  /  DBili  .13  /  AST  17  /  ALT  23  /  AlkPhos  60  04-11    PT/INR - ( 2021 11:16 )   PT: 13.7 sec;   INR: 1.19 ratio      Urinalysis Basic - ( 10 Apr 2021 21:18 )    Color: Yellow / Appearance: Clear / S.015 / pH: x  Gluc: x / Ketone: Small  / Bili: Negative / Urobili: 4 mg/dL   Blood: x / Protein: 30 mg/dL / Nitrite: Negative   Leuk Esterase: Negative / RBC: 0-2 /HPF / WBC 6-10   Sq Epi: x / Non Sq Epi: Few / Bacteria: Many    EKG: sinus    RADIOLOGY & ADDITIONAL STUDIES:  r< from: CT Angio Chest w/ IV Cont (21 @ 01:06) >  EXAM:  CT ANGIO CHEST (W)AW                         PROCEDURE DATE:  2021      INTERPRETATION:  Chest CT angiography (Pulmonary embolism protocol)    Indication:  Syncope, COVID positive    Technique:  Axial postcontrast images wereobtained during maximal projected opacification of the pulmonary arterial vasculature. Reformatted coronal and sagittal images are submitted.  Axial MIP images are submitted.  CONTRAST/COMPLICATIONS:  IV Contrast: Omnipaque 350  90 cc administered   10 cc discarded  Oral Contrast: NONE  Complications: None reported at time of study completion    Comparison:  none    FINDINGS:    Pulmonary arterial opacification is optimal.  There is no evidence of pulmonary embolism.  The great vessels are not dilated.  No aortic dissection.    The visualized neck, axilla and subcutaneous tissues are unremarkable.    The tracheobronchial tree is patent centrally.  There is no significant mediastinal or hilar adenopathy.    The heart is not enlarged.  There is no pericardial effusion.    Patchy groundglass and airspace opacities predominantly involving the right lung with peripheral predominance suggesting atypical viral infection including Covid-19.    The visualized bones and upper abdomen are unremarkable.    IMPRESSION:    No evidence of pulmonary embolism.    Airspace and groundglass opacities predominantly involving the right lung peripherally suggesting atypical viral infection including Covid-19.    ONEIDA TAYLOR MD; Attending Radiologist  This document has been electronically signed. 2021  1:26AM    ASSESSMENT AND PLAN:  ·	COVID Pneumonia.  ·	Hypotension.  ·	Dehydration.  ·	HTN.    Continue Remdesivir and dexamethasone.  IV hydration.  DVT prophylaxis.
Patient/Caregiver provided printed discharge information.

## 2021-04-11 NOTE — H&P ADULT - NSHPPHYSICALEXAM_GEN_ALL_CORE
Vital Signs Last 24 Hrs  T(C): 38.2 (11 Apr 2021 06:04), Max: 38.2 (11 Apr 2021 06:04)  T(F): 100.8 (11 Apr 2021 06:04), Max: 100.8 (11 Apr 2021 06:04)  HR: 97 (11 Apr 2021 06:04) (80 - 97)  BP: 135/84 (11 Apr 2021 06:04) (113/63 - 135/84)  BP(mean): --  RR: 20 (11 Apr 2021 06:04) (18 - 23)  SpO2: 92% (11 Apr 2021 06:04) (92% - 99%)    PHYSICAL EXAM:    GENERAL: NAD, well-groomed, well-developed  HEAD:  Atraumatic, Normocephalic  EYES: EOMI, PERRLA, conjunctiva and sclera clear  ENMT: No tonsillar erythema, exudates, or enlargement; Moist mucous membranes, No lesions  NECK: Supple, No JVD, Normal thyroid  NERVOUS SYSTEM:  Alert & Oriented X3, Good concentration; Motor Strength 5/5 B/L upper and lower extremities; DTRs 2+ intact and symmetric  CHEST/LUNG: Clear to percussion bilaterally; No rales, rhonchi, wheezing, or rubs  HEART: Regular rate and rhythm; No rubs, or gallops, +S1,S2  ABDOMEN: Soft, Nontender, Nondistended; Bowel sounds present  EXTREMITIES:  2+ Peripheral Pulses, No clubbing, cyanosis, or edema  LYMPH: No cervical adenopathy  RECTAL: deferred  BREAST: No palpatble masses, skin no lesions   : deferred  SKIN: No rashes or lesions    IMPROVE VTE Individual Risk Assessment        RISK                                                          Points  [  ] Previous VTE                                                3  [  ] Thrombophilia                                             2  [  ] Lower limb paralysis                                    2        (unable to hold up >15 seconds)    [  ] Current Cancer                                             2         (within 6 months)  [ x ] Immobilization > 24 hrs                              1  [  ] ICU/CCU stay > 24 hours                            1  [  ] Age > 60                                                    1  IMPROVE VTE Score ____1_____

## 2021-04-12 LAB
A1C WITH ESTIMATED AVERAGE GLUCOSE RESULT: 7 % — HIGH (ref 4–5.6)
ALBUMIN SERPL ELPH-MCNC: 3.1 G/DL — LOW (ref 3.3–5)
ALBUMIN SERPL ELPH-MCNC: 3.1 G/DL — LOW (ref 3.3–5)
ALP SERPL-CCNC: 63 U/L — SIGNIFICANT CHANGE UP (ref 40–120)
ALP SERPL-CCNC: 64 U/L — SIGNIFICANT CHANGE UP (ref 40–120)
ALT FLD-CCNC: 29 U/L — SIGNIFICANT CHANGE UP (ref 12–78)
ALT FLD-CCNC: 31 U/L — SIGNIFICANT CHANGE UP (ref 12–78)
ANION GAP SERPL CALC-SCNC: 4 MMOL/L — LOW (ref 5–17)
AST SERPL-CCNC: 31 U/L — SIGNIFICANT CHANGE UP (ref 15–37)
AST SERPL-CCNC: 36 U/L — SIGNIFICANT CHANGE UP (ref 15–37)
BILIRUB DIRECT SERPL-MCNC: 0.19 MG/DL — SIGNIFICANT CHANGE UP (ref 0.05–0.2)
BILIRUB INDIRECT FLD-MCNC: 0.2 MG/DL — SIGNIFICANT CHANGE UP (ref 0.2–1)
BILIRUB SERPL-MCNC: 0.4 MG/DL — SIGNIFICANT CHANGE UP (ref 0.2–1.2)
BILIRUB SERPL-MCNC: 0.4 MG/DL — SIGNIFICANT CHANGE UP (ref 0.2–1.2)
BUN SERPL-MCNC: 15 MG/DL — SIGNIFICANT CHANGE UP (ref 7–23)
CALCIUM SERPL-MCNC: 8.3 MG/DL — LOW (ref 8.5–10.1)
CHLORIDE SERPL-SCNC: 109 MMOL/L — HIGH (ref 96–108)
CO2 SERPL-SCNC: 28 MMOL/L — SIGNIFICANT CHANGE UP (ref 22–31)
COVID-19 SPIKE DOMAIN AB INTERP: NEGATIVE — SIGNIFICANT CHANGE UP
COVID-19 SPIKE DOMAIN ANTIBODY RESULT: 0.4 U/ML — SIGNIFICANT CHANGE UP
CREAT SERPL-MCNC: 0.93 MG/DL — SIGNIFICANT CHANGE UP (ref 0.5–1.3)
CULTURE RESULTS: SIGNIFICANT CHANGE UP
ESTIMATED AVERAGE GLUCOSE: 154 MG/DL — HIGH (ref 68–114)
GLUCOSE BLDC GLUCOMTR-MCNC: 159 MG/DL — HIGH (ref 70–99)
GLUCOSE SERPL-MCNC: 135 MG/DL — HIGH (ref 70–99)
INR BLD: 1.18 RATIO — HIGH (ref 0.88–1.16)
MAGNESIUM SERPL-MCNC: 2.3 MG/DL — SIGNIFICANT CHANGE UP (ref 1.6–2.6)
PHOSPHATE SERPL-MCNC: 3 MG/DL — SIGNIFICANT CHANGE UP (ref 2.5–4.5)
POTASSIUM SERPL-MCNC: 4.4 MMOL/L — SIGNIFICANT CHANGE UP (ref 3.5–5.3)
POTASSIUM SERPL-SCNC: 4.4 MMOL/L — SIGNIFICANT CHANGE UP (ref 3.5–5.3)
PROT SERPL-MCNC: 6.4 GM/DL — SIGNIFICANT CHANGE UP (ref 6–8.3)
PROT SERPL-MCNC: 6.5 GM/DL — SIGNIFICANT CHANGE UP (ref 6–8.3)
PROTHROM AB SERPL-ACNC: 13.6 SEC — SIGNIFICANT CHANGE UP (ref 10.6–13.6)
SARS-COV-2 IGG+IGM SERPL QL IA: 0.4 U/ML — SIGNIFICANT CHANGE UP
SARS-COV-2 IGG+IGM SERPL QL IA: NEGATIVE — SIGNIFICANT CHANGE UP
SODIUM SERPL-SCNC: 141 MMOL/L — SIGNIFICANT CHANGE UP (ref 135–145)
SPECIMEN SOURCE: SIGNIFICANT CHANGE UP

## 2021-04-12 PROCEDURE — 99233 SBSQ HOSP IP/OBS HIGH 50: CPT

## 2021-04-12 RX ORDER — AMLODIPINE BESYLATE AND OLMESARTRAN MEDOXOMIL 10; 40 MG/1; MG/1
1 TABLET, FILM COATED ORAL
Qty: 0 | Refills: 0 | DISCHARGE

## 2021-04-12 RX ADMIN — REMDESIVIR 500 MILLIGRAM(S): 5 INJECTION INTRAVENOUS at 11:11

## 2021-04-12 RX ADMIN — ENOXAPARIN SODIUM 40 MILLIGRAM(S): 100 INJECTION SUBCUTANEOUS at 11:11

## 2021-04-12 RX ADMIN — SODIUM CHLORIDE 60 MILLILITER(S): 9 INJECTION INTRAMUSCULAR; INTRAVENOUS; SUBCUTANEOUS at 21:53

## 2021-04-12 RX ADMIN — Medication 6 MILLIGRAM(S): at 05:15

## 2021-04-13 LAB
ALBUMIN SERPL ELPH-MCNC: 2.9 G/DL — LOW (ref 3.3–5)
ALP SERPL-CCNC: 58 U/L — SIGNIFICANT CHANGE UP (ref 40–120)
ALT FLD-CCNC: 33 U/L — SIGNIFICANT CHANGE UP (ref 12–78)
AST SERPL-CCNC: 31 U/L — SIGNIFICANT CHANGE UP (ref 15–37)
BILIRUB DIRECT SERPL-MCNC: 0.12 MG/DL — SIGNIFICANT CHANGE UP (ref 0.05–0.2)
BILIRUB INDIRECT FLD-MCNC: 0.2 MG/DL — SIGNIFICANT CHANGE UP (ref 0.2–1)
BILIRUB SERPL-MCNC: 0.3 MG/DL — SIGNIFICANT CHANGE UP (ref 0.2–1.2)
GLUCOSE BLDC GLUCOMTR-MCNC: 118 MG/DL — HIGH (ref 70–99)
GLUCOSE BLDC GLUCOMTR-MCNC: 145 MG/DL — HIGH (ref 70–99)
GLUCOSE BLDC GLUCOMTR-MCNC: 150 MG/DL — HIGH (ref 70–99)
GLUCOSE BLDC GLUCOMTR-MCNC: 224 MG/DL — HIGH (ref 70–99)
INR BLD: 1.15 RATIO — SIGNIFICANT CHANGE UP (ref 0.88–1.16)
PROT SERPL-MCNC: 6 GM/DL — SIGNIFICANT CHANGE UP (ref 6–8.3)
PROTHROM AB SERPL-ACNC: 13.2 SEC — SIGNIFICANT CHANGE UP (ref 10.6–13.6)

## 2021-04-13 PROCEDURE — 99233 SBSQ HOSP IP/OBS HIGH 50: CPT

## 2021-04-13 RX ORDER — HYDROCHLOROTHIAZIDE 25 MG
12.5 TABLET ORAL DAILY
Refills: 0 | Status: DISCONTINUED | OUTPATIENT
Start: 2021-04-13 | End: 2021-04-14

## 2021-04-13 RX ORDER — INSULIN LISPRO 100/ML
VIAL (ML) SUBCUTANEOUS AT BEDTIME
Refills: 0 | Status: DISCONTINUED | OUTPATIENT
Start: 2021-04-13 | End: 2021-04-14

## 2021-04-13 RX ORDER — GLUCAGON INJECTION, SOLUTION 0.5 MG/.1ML
1 INJECTION, SOLUTION SUBCUTANEOUS ONCE
Refills: 0 | Status: DISCONTINUED | OUTPATIENT
Start: 2021-04-13 | End: 2021-04-14

## 2021-04-13 RX ORDER — LOSARTAN POTASSIUM 100 MG/1
100 TABLET, FILM COATED ORAL DAILY
Refills: 0 | Status: DISCONTINUED | OUTPATIENT
Start: 2021-04-13 | End: 2021-04-14

## 2021-04-13 RX ORDER — AMLODIPINE BESYLATE 2.5 MG/1
10 TABLET ORAL DAILY
Refills: 0 | Status: DISCONTINUED | OUTPATIENT
Start: 2021-04-13 | End: 2021-04-14

## 2021-04-13 RX ORDER — DEXTROSE 50 % IN WATER 50 %
15 SYRINGE (ML) INTRAVENOUS ONCE
Refills: 0 | Status: DISCONTINUED | OUTPATIENT
Start: 2021-04-13 | End: 2021-04-14

## 2021-04-13 RX ORDER — DEXTROSE 50 % IN WATER 50 %
25 SYRINGE (ML) INTRAVENOUS ONCE
Refills: 0 | Status: DISCONTINUED | OUTPATIENT
Start: 2021-04-13 | End: 2021-04-14

## 2021-04-13 RX ORDER — DEXTROSE 50 % IN WATER 50 %
12.5 SYRINGE (ML) INTRAVENOUS ONCE
Refills: 0 | Status: DISCONTINUED | OUTPATIENT
Start: 2021-04-13 | End: 2021-04-14

## 2021-04-13 RX ORDER — SODIUM CHLORIDE 9 MG/ML
1000 INJECTION, SOLUTION INTRAVENOUS
Refills: 0 | Status: DISCONTINUED | OUTPATIENT
Start: 2021-04-13 | End: 2021-04-14

## 2021-04-13 RX ORDER — INSULIN LISPRO 100/ML
VIAL (ML) SUBCUTANEOUS
Refills: 0 | Status: DISCONTINUED | OUTPATIENT
Start: 2021-04-13 | End: 2021-04-14

## 2021-04-13 RX ADMIN — REMDESIVIR 500 MILLIGRAM(S): 5 INJECTION INTRAVENOUS at 11:00

## 2021-04-13 RX ADMIN — Medication 6 MILLIGRAM(S): at 05:19

## 2021-04-13 RX ADMIN — ENOXAPARIN SODIUM 40 MILLIGRAM(S): 100 INJECTION SUBCUTANEOUS at 11:00

## 2021-04-13 NOTE — PROGRESS NOTE ADULT - ASSESSMENT
51 y/o male with htn and covid pna comes w/syncope and dehydration    Problem/Plan - 1:  ·  Problem: Syncope and collapse.  Plan: admit to tele  likely component from poor po intake and hypotension from bp meds  pt received fluid in ed,   encourage po intake.     Problem/Plan - 2:  ·  Problem: Acute hypoxic respiratory failure due to COVID-19 PNA    Plan: isolation  start remdeisivir given short duration of sx's  start dexamethasone   prone positioning  - d-dimer q 3 days as well as CRP  - check crp  - lovenox 40 but will increase to intermediate dose if 02 sats decrease, d-dimer increases, or SIC score > 4,  - CTA is negative but will still trend d-dimer    Problem/Plan - 3:  ·  Problem: Hypoxia.  Plan: o2 supplementation  dexamethasone.   - for rapidly worsening hypoxia and increased 02 requirements would give 1 dose of Tocilizumab after consulting ID    Problem/Plan - 4:  ·  Problem: Essential hypertension.  Plan: hold bp meds. 
49 y/o male with htn and covid pna comes w/syncope and dehydration    Problem/Plan - 1:  ·  Problem: Syncope and collapse.  Plan: admit to tele  likely component from poor po intake and hypotension from bp meds  pt received fluid in ed,   encourage po intake.   - d/c fluids   - d/c planning when off 02    Problem/Plan - 2:  ·  Problem: Acute hypoxic respiratory failure due to COVID-19 PNA    Plan: isolation  start remdeisivir given short duration of sx's  start dexamethasone   prone positioning  - d-dimer q 3 days as well as CRP  - check crp  - lovenox 40 but will increase to intermediate dose if 02 sats decrease, d-dimer increases, or SIC score > 4,  - CTA is negative but will still trend d-dimer    Problem/Plan - 3:  ·  Problem: Hypoxia.  Plan: o2 supplementation  dexamethasone.   - for rapidly worsening hypoxia and increased 02 requirements would give 1 dose of Tocilizumab after consulting ID    Problem/Plan - 4:  ·  Problem: Essential hypertension.  Plan: hold bp meds.

## 2021-04-14 ENCOUNTER — TRANSCRIPTION ENCOUNTER (OUTPATIENT)
Age: 51
End: 2021-04-14

## 2021-04-14 VITALS
OXYGEN SATURATION: 94 % | RESPIRATION RATE: 18 BRPM | TEMPERATURE: 98 F | HEART RATE: 80 BPM | DIASTOLIC BLOOD PRESSURE: 82 MMHG | SYSTOLIC BLOOD PRESSURE: 123 MMHG

## 2021-04-14 LAB
ALBUMIN SERPL ELPH-MCNC: 2.9 G/DL — LOW (ref 3.3–5)
ALP SERPL-CCNC: 60 U/L — SIGNIFICANT CHANGE UP (ref 40–120)
ALT FLD-CCNC: 38 U/L — SIGNIFICANT CHANGE UP (ref 12–78)
AST SERPL-CCNC: 26 U/L — SIGNIFICANT CHANGE UP (ref 15–37)
BILIRUB DIRECT SERPL-MCNC: 0.1 MG/DL — SIGNIFICANT CHANGE UP (ref 0.05–0.2)
BILIRUB INDIRECT FLD-MCNC: 0.3 MG/DL — SIGNIFICANT CHANGE UP (ref 0.2–1)
BILIRUB SERPL-MCNC: 0.4 MG/DL — SIGNIFICANT CHANGE UP (ref 0.2–1.2)
CRP SERPL-MCNC: 22 MG/L — HIGH
D DIMER BLD IA.RAPID-MCNC: 200 NG/ML DDU — SIGNIFICANT CHANGE UP
GLUCOSE BLDC GLUCOMTR-MCNC: 142 MG/DL — HIGH (ref 70–99)
GLUCOSE BLDC GLUCOMTR-MCNC: 159 MG/DL — HIGH (ref 70–99)
GLUCOSE BLDC GLUCOMTR-MCNC: 382 MG/DL — HIGH (ref 70–99)
INR BLD: 1.16 RATIO — SIGNIFICANT CHANGE UP (ref 0.88–1.16)
PROT SERPL-MCNC: 5.8 GM/DL — LOW (ref 6–8.3)
PROTHROM AB SERPL-ACNC: 13.4 SEC — SIGNIFICANT CHANGE UP (ref 10.6–13.6)

## 2021-04-14 PROCEDURE — 99239 HOSP IP/OBS DSCHRG MGMT >30: CPT

## 2021-04-14 RX ORDER — METFORMIN HYDROCHLORIDE 850 MG/1
500 TABLET ORAL
Refills: 0 | Status: DISCONTINUED | OUTPATIENT
Start: 2021-04-14 | End: 2021-04-14

## 2021-04-14 RX ORDER — ASPIRIN/CALCIUM CARB/MAGNESIUM 324 MG
1 TABLET ORAL
Qty: 30 | Refills: 0
Start: 2021-04-14

## 2021-04-14 RX ORDER — ISOPROPYL ALCOHOL, BENZOCAINE .7; .06 ML/ML; ML/ML
1 SWAB TOPICAL
Qty: 100 | Refills: 1
Start: 2021-04-14 | End: 2021-06-02

## 2021-04-14 RX ORDER — METFORMIN HYDROCHLORIDE 850 MG/1
1 TABLET ORAL
Qty: 60 | Refills: 0
Start: 2021-04-14

## 2021-04-14 RX ADMIN — METFORMIN HYDROCHLORIDE 500 MILLIGRAM(S): 850 TABLET ORAL at 18:23

## 2021-04-14 RX ADMIN — ENOXAPARIN SODIUM 40 MILLIGRAM(S): 100 INJECTION SUBCUTANEOUS at 11:40

## 2021-04-14 RX ADMIN — AMLODIPINE BESYLATE 10 MILLIGRAM(S): 2.5 TABLET ORAL at 05:39

## 2021-04-14 RX ADMIN — Medication 12.5 MILLIGRAM(S): at 05:39

## 2021-04-14 RX ADMIN — Medication 2: at 16:36

## 2021-04-14 RX ADMIN — Medication 6 MILLIGRAM(S): at 05:39

## 2021-04-14 RX ADMIN — Medication 10: at 11:40

## 2021-04-14 RX ADMIN — REMDESIVIR 500 MILLIGRAM(S): 5 INJECTION INTRAVENOUS at 11:40

## 2021-04-14 RX ADMIN — LOSARTAN POTASSIUM 100 MILLIGRAM(S): 100 TABLET, FILM COATED ORAL at 05:39

## 2021-04-14 NOTE — DISCHARGE NOTE PROVIDER - NSDCMRMEDTOKEN_GEN_ALL_CORE_FT
alcohol swabs : Apply topically to affected area 4 times a day   Aspirin Enteric Coated 81 mg oral delayed release tablet: 1 tab(s) orally once a day   glucometer (per patient&#x27;s insurance): Test blood sugars four times a day. Dispense #1 glucometer.  Insulin Pen Needles, 4mm: 1 application subcutaneously 4 times a day. ** Use with insulin pen **   lancets: 1 application subcutaneously 4 times a day   metFORMIN 500 mg oral tablet: 1 tab(s) orally 2 times a day  olmasartan amlodipine hctz: 40/10/12.5  ondansetron 4 mg oral tablet: 1 tab(s) orally 3 times a day, As Needed  Take as needed for nausea   Percocet 5 mg-325 mg oral tablet: 1-2 tab(s) orally every 4 hours, As Needed MDD:6   test strips (per patient&#x27;s insurance): 1 application subcutaneously 4 times a day. ** Compatible with patient&#x27;s glucometer **  U-100 Insulin Syringe, 1 mL: 1 application subcutaneously 2 times a day ** 1 mL holds up to 100 units of insulin **

## 2021-04-14 NOTE — DISCHARGE NOTE PROVIDER - NSDCCPCAREPLAN_GEN_ALL_CORE_FT
PRINCIPAL DISCHARGE DIAGNOSIS  Diagnosis: Syncope and collapse  Assessment and Plan of Treatment:       SECONDARY DISCHARGE DIAGNOSES  Diagnosis: COVID-19  Assessment and Plan of Treatment:     Diagnosis: Hypoxia  Assessment and Plan of Treatment:

## 2021-04-14 NOTE — DISCHARGE NOTE PROVIDER - HOSPITAL COURSE
51 y/o male with htn and covid pna comes w/syncope and dehydration. found to have Hypoxia with acute COVID-19 infection with viral pneumonia.     Problem/Plan - 1:  ·  Problem: Syncope and collapse.   likely component from poor po intake and hypotension from bp meds  s/p IVF   no dizziness.     Problem/Plan - 2:  ·  Problem: Acute hypoxic respiratory failure due to COVID-19 PNA  improved significantly. discussed with nurse and  care manager to check for home oxygen.   patient is ready for discharge home and instructed to follow up with PCP for continuation of care.  take aspirin for 30 days       Newly detected DM type 2. Hemoglobin A 1c 7. start metformin at home. education provided about Diabetes, diet and exercise.     Problem/Plan - 4:  ·  Problem: Essential Hypertension. Controlled. Cont current management..       Seen and examined by me today. Vitals stable.   I have discussed all the inpatient radiographic findings with the patient and stressed that patient follows with the PCP for further outpatient care. I have printed inpatient lab results and report of imaging studies and given these to the patient/family to help with further outpatient care with PCP.   All questions welcomed and answered appropriately. Patient verbalized understanding of post discharge physician's follows up and discharge instructions.   DC time spent by me excluding billable procedures 38 mins

## 2021-04-14 NOTE — DISCHARGE NOTE PROVIDER - NSDCFUADDINST_GEN_ALL_CORE_FT
1) It is important to see your primary physician as well as other necessary consultants within the next week to perform a comprehensive medical review.  Call their offices for an appointment as soon as you leave the hospital.  If you do not have a primary physician or unable to reach your PCP, contact the [facility] Physician Referral Service at [phone].  Your medical issues appear to be stable at this time, but if your symptoms recur or worsen, contact your physicians and/or return to the hospital if necessary.  If you encounter any issues or questions with your medication, call your physicians before stopping the medication.     2) check finger sticks and blood pressure at home and keep a log for PCP     3) take aspirin 81 mg daily x 4 weeks

## 2021-04-14 NOTE — DISCHARGE NOTE NURSING/CASE MANAGEMENT/SOCIAL WORK - PATIENT PORTAL LINK FT
You can access the FollowMyHealth Patient Portal offered by [facility] by registering at the following website: [email or web address]. By joining [email or web address]’s FollowMyHealth portal, you will also be able to view your health information using other applications (apps) compatible with our system.

## 2021-04-14 NOTE — PROGRESS NOTE ADULT - SUBJECTIVE AND OBJECTIVE BOX
Patient is a 50y old  Male who presents with a chief complaint of syncope (12 Apr 2021 21:23)    HPI:  Pt is a 51 y/o male w/pmhx of htn and known covid diagnosed 3 days ago w/poor po intake over this time, was going to get something from fridge and passed out. Wife heard a fall and saw pt w/eyes closed next to fridge door.  pt w/intermittent fever, no sob, cp, palpitations, +n/-v/-d/-c +generalized malaise and fatigue poor appetite and po intake over this time.  + other family members w/covid.  in ed pt has been sating low to mid 90's, had a cta negative for PE and shows covid pna. (11 Apr 2021 06:02)    SUBJECTIVE & OBJECTIVE: Pt seen and examined at bedside.   PHYSICAL EXAM:  ICU Vital Signs Last 24 Hrs  T(C): 36.7 (13 Apr 2021 11:19), Max: 37.1 (12 Apr 2021 17:00)  T(F): 98.1 (13 Apr 2021 11:19), Max: 98.8 (12 Apr 2021 17:00)  HR: 73 (13 Apr 2021 11:19) (73 - 78)  BP: 127/85 (13 Apr 2021 11:19) (127/85 - 149/86)  BP(mean): --  ABP: --  ABP(mean): --  RR: 18 (13 Apr 2021 11:19) (18 - 18)  SpO2: 95% (13 Apr 2021 11:19) (90% - 95%)  Daily     Daily I&O's Detail    12 Apr 2021 07:01  -  13 Apr 2021 07:00  --------------------------------------------------------  IN:    IV PiggyBack: 250 mL    Oral Fluid: 840 mL    sodium chloride 0.9%: 570 mL  Total IN: 1660 mL    OUT:    Voided (mL): 800 mL  Total OUT: 800 mL    Total NET: 860 mL      13 Apr 2021 07:01  -  13 Apr 2021 15:15  --------------------------------------------------------  IN:    Oral Fluid: 600 mL  Total IN: 600 mL    OUT:    Voided (mL): 700 mL  Total OUT: 700 mL    Total NET: -100 mL        GENERAL: NAD, well-groomed, well-developed  HEAD:  Atraumatic, Normocephalic  EYES: EOMI, PERRLA, conjunctiva and sclera clear  ENMT: Moist mucous membranes  NECK: Supple, No JVD  NERVOUS SYSTEM:  Alert & Oriented X3, Motor Strength 5/5 B/L upper and lower extremities; DTRs 2+ intact and symmetric  CHEST/LUNG: Clear to auscultation bilaterally; No rales, rhonchi, wheezing, or rubs  HEART: Regular rate and rhythm; No murmurs, rubs, or gallops  ABDOMEN: Soft, Nontender, Nondistended; Bowel sounds present  EXTREMITIES:  2+ Peripheral Pulses, No clubbing, cyanosis, or edema  LABS:  PT/INR - ( 13 Apr 2021 07:54 )   PT: 13.2 sec;   INR: 1.15 ratio         CAPILLARY BLOOD GLUCOSE      POCT Blood Glucose.: 224 mg/dL (13 Apr 2021 11:31)  POCT Blood Glucose.: 118 mg/dL (13 Apr 2021 08:08)  POCT Blood Glucose.: 159 mg/dL (12 Apr 2021 21:52)    RECENT CULTURES:  RADIOLOGY & ADDITIONAL TESTS:  
  INTERVAL HPI:  49 y/o male w/pmhx of htn and known covid diagnosed 3 days ago w/poor po intake over this time, was going to get something from fridge and passed out. Wife heard a fall and saw pt w/eyes closed next to fridge door.  pt w/intermittent fever, no sob, cp, palpitations, +n/-v/-d/-c +generalized malaise and fatigue poor appetite and po intake over this time.  + other family members w/covid.  in ed pt has been sating low to mid 90's, had a cta negative for PE and shows covid pna. (11 Apr 2021 06:02).  Admitted with low BP and COVID pneumonia.    OVERNIGHT EVENTS:  Feels better.    Vital Signs Last 24 Hrs  T(C): 37.1 (12 Apr 2021 17:00), Max: 37.1 (12 Apr 2021 05:10)  T(F): 98.8 (12 Apr 2021 17:00), Max: 98.8 (12 Apr 2021 05:10)  HR: 78 (12 Apr 2021 17:00) (77 - 85)  BP: 136/81 (12 Apr 2021 17:00) (119/79 - 141/79)  BP(mean): --  RR: 18 (12 Apr 2021 17:00) (18 - 18)  SpO2: 90% (12 Apr 2021 17:00) (90% - 96%)    PHYSICAL EXAM:  GEN:         Awake, responsive and comfortable.  HEENT:    Normal.    RESP:       no wheezing.  CVS:          Regular rate and rhythm.   ABD:         Soft, non-tender, non-distended;     MEDICATIONS  (STANDING):  dexAMETHasone  Injectable 6 milliGRAM(s) IV Push daily  enoxaparin Injectable 40 milliGRAM(s) SubCutaneous daily  remdesivir  IVPB   IV Intermittent   remdesivir  IVPB 100 milliGRAM(s) IV Intermittent every 24 hours  sodium chloride 0.9%. 1000 milliLiter(s) (60 mL/Hr) IV Continuous <Continuous>    MEDICATIONS  (PRN):  acetaminophen   Tablet .. 650 milliGRAM(s) Oral every 6 hours PRN Temp greater or equal to 38C (100.4F), Mild Pain (1 - 3)    LABS:    04-12    141  |  109<H>  |  15  ----------------------------<  135<H>  4.4   |  28  |  0.93    Ca    8.3<L>      12 Apr 2021 09:20  Phos  3.0     04-12  Mg     2.3     04-12    TPro  6.5  /  Alb  3.1<L>  /  TBili  0.4  /  DBili  .19  /  AST  36  /  ALT  31  /  AlkPhos  64  04-12    PT/INR - ( 12 Apr 2021 09:20 )   PT: 13.6 sec;   INR: 1.18 ratio       ASSESSMENT AND PLAN:  ·	COVID Pneumonia.  ·	Hypotension.  ·	Dehydration.  ·	HTN.    Clinically better.  Continue treatment      
  INTERVAL HPI:  51 y/o male w/pmhx of htn and known covid diagnosed 3 days ago w/poor po intake over this time, was going to get something from fridge and passed out. Wife heard a fall and saw pt w/eyes closed next to fridge door.  pt w/intermittent fever, no sob, cp, palpitations, +n/-v/-d/-c +generalized malaise and fatigue poor appetite and po intake over this time.  + other family members w/covid.  in ed pt has been sating low to mid 90's, had a cta negative for PE and shows covid pna. (11 Apr 2021 06:02).  Admitted with low BP and COVID pneumonia.    OVERNIGHT EVENTS:  Comfortable on nasal o2.    Vital Signs Last 24 Hrs  T(C): 36.8 (14 Apr 2021 11:02), Max: 37.3 (13 Apr 2021 17:42)  T(F): 98.3 (14 Apr 2021 11:02), Max: 99.2 (13 Apr 2021 17:42)  HR: 88 (14 Apr 2021 11:02) (73 - 88)  BP: 119/75 (14 Apr 2021 11:02) (119/75 - 158/97)  BP(mean): --  RR: 18 (14 Apr 2021 11:02) (17 - 18)  SpO2: 94% (14 Apr 2021 12:33) (87% - 94%)    PHYSICAL EXAM:  GEN:         Awake, responsive and comfortable.  HEENT:    Normal.    RESP:        no distress  CVS:             Regular rate and rhythm.   ABD:         Soft, non-tender, non-distended;     MEDICATIONS  (STANDING):  amLODIPine   Tablet 10 milliGRAM(s) Oral daily  dexAMETHasone  Injectable 6 milliGRAM(s) IV Push daily  dextrose 40% Gel 15 Gram(s) Oral once  dextrose 5%. 1000 milliLiter(s) (50 mL/Hr) IV Continuous <Continuous>  dextrose 5%. 1000 milliLiter(s) (100 mL/Hr) IV Continuous <Continuous>  dextrose 50% Injectable 25 Gram(s) IV Push once  dextrose 50% Injectable 12.5 Gram(s) IV Push once  dextrose 50% Injectable 25 Gram(s) IV Push once  enoxaparin Injectable 40 milliGRAM(s) SubCutaneous daily  glucagon  Injectable 1 milliGRAM(s) IntraMuscular once  hydrochlorothiazide 12.5 milliGRAM(s) Oral daily  insulin lispro (ADMELOG) corrective regimen sliding scale   SubCutaneous three times a day before meals  insulin lispro (ADMELOG) corrective regimen sliding scale   SubCutaneous at bedtime  losartan 100 milliGRAM(s) Oral daily  metFORMIN 500 milliGRAM(s) Oral two times a day  remdesivir  IVPB   IV Intermittent   remdesivir  IVPB 100 milliGRAM(s) IV Intermittent every 24 hours    MEDICATIONS  (PRN):  acetaminophen   Tablet .. 650 milliGRAM(s) Oral every 6 hours PRN Temp greater or equal to 38C (100.4F), Mild Pain (1 - 3)    LABS:    TPro  5.8<L>  /  Alb  2.9<L>  /  TBili  0.4  /  DBili  .10  /  AST  26  /  ALT  38  /  AlkPhos  60  04-14    PT/INR - ( 14 Apr 2021 07:22 )   PT: 13.4 sec;   INR: 1.16 ratio       ASSESSMENT AND PLAN:  ·	COVID Pneumonia.  ·	Hypotension.  ·	Dehydration.  ·	HTN.    SPO2 94% on nasal O2.  Continue Remdesivir and Dexamethasone    
Patient is a 50y old  Male who presents with a chief complaint of syncope (2021 06:02)    HPI:Pt is a 51 y/o male w/pmhx of htn and known covid diagnosed 3 days ago w/poor po intake over this time, was going to get something from fridge and passed out. Wife heard a fall and saw pt w/eyes closed next to fridge door.  pt w/intermittent fever, no sob, cp, palpitations, +n/-v/-d/-c +generalized malaise and fatigue poor appetite and po intake over this time.  + other family members w/covid.  in ed pt has been sating low to mid 90's, had a cta negative for PE and shows covid pna. (2021 06:02)    SUBJECTIVE & OBJECTIVE: Pt seen and examined at bedside. Febrile and requiring 2L NC  PHYSICAL EXAM:  Vital Signs Last 24 Hrs  T(C): 37 (2021 17:11), Max: 38.9 (2021 10:57)  T(F): 98.6 (2021 17:11), Max: 102.1 (2021 10:57)  HR: 87 (2021 17:11) (67 - 97)  BP: 124/80 (2021 17:11) (113/63 - 135/84)  RR: 19 (2021 17:11) (18 - 23)  SpO2: 94% (2021 17:11) (91% - 99%)  Daily Height in cm: 175.26 (10 Apr 2021 19:34)    Daily I&O's Detail    GENERAL: NAD, well-groomed, well-developed  HEAD:  Atraumatic, Normocephalic  EYES: EOMI, PERRLA, conjunctiva and sclera clear  ENMT: Moist mucous membranes  NECK: Supple, No JVD  NERVOUS SYSTEM:  Alert & Oriented X3, Motor Strength 5/5 B/L upper and lower extremities; DTRs 2+ intact and symmetric  CHEST/LUNG: Clear to auscultation bilaterally; No rales, rhonchi, wheezing, or rubs  HEART: Regular rate and rhythm; No murmurs, rubs, or gallops  ABDOMEN: Soft, Nontender, Nondistended; Bowel sounds present  EXTREMITIES:  2+ Peripheral Pulses, No clubbing, cyanosis, or edema  LABS:                        12.9   4.48  )-----------( 125      ( 10 Apr 2021 21:10 )             40.5   PT/INR - ( 2021 11:16 )   PT: 13.7 sec;   INR: 1.19 ratio         Urinalysis Basic - ( 10 Apr 2021 21:18 )    Color: Yellow / Appearance: Clear / S.015 / pH: x  Gluc: x / Ketone: Small  / Bili: Negative / Urobili: 4 mg/dL   Blood: x / Protein: 30 mg/dL / Nitrite: Negative   Leuk Esterase: Negative / RBC: 0-2 /HPF / WBC 6-10   Sq Epi: x / Non Sq Epi: Few / Bacteria: Many    CAPILLARY BLOOD GLUCOSE        RECENT CULTURES:  RADIOLOGY & ADDITIONAL TESTS:  
  INTERVAL HPI:  49 y/o male w/pmhx of htn and known covid diagnosed 3 days ago w/poor po intake over this time, was going to get something from fridge and passed out. Wife heard a fall and saw pt w/eyes closed next to fridge door.  pt w/intermittent fever, no sob, cp, palpitations, +n/-v/-d/-c +generalized malaise and fatigue poor appetite and po intake over this time.  + other family members w/covid.  in ed pt has been sating low to mid 90's, had a cta negative for PE and shows covid pna. (11 Apr 2021 06:02).  Admitted with low BP and COVID pneumonia.    OVERNIGHT EVENTS:  Comfortable    Vital Signs Last 24 Hrs  T(C): 37.3 (13 Apr 2021 17:42), Max: 37.3 (13 Apr 2021 17:42)  T(F): 99.2 (13 Apr 2021 17:42), Max: 99.2 (13 Apr 2021 17:42)  HR: 82 (13 Apr 2021 17:42) (73 - 82)  BP: 133/79 (13 Apr 2021 17:42) (127/85 - 149/86)  BP(mean): --  RR: 17 (13 Apr 2021 17:42) (17 - 18)  SpO2: 94% (13 Apr 2021 17:42) (94% - 95%)    PHYSICAL EXAM:  GEN:         Awake, responsive and comfortable.  HEENT:    Normal.    RESP:        no distress  CVS:             Regular rate and rhythm.   ABD:         Soft, non-tender, non-distended;     MEDICATIONS  (STANDING):  amLODIPine   Tablet 10 milliGRAM(s) Oral daily  dexAMETHasone  Injectable 6 milliGRAM(s) IV Push daily  dextrose 40% Gel 15 Gram(s) Oral once  dextrose 5%. 1000 milliLiter(s) (50 mL/Hr) IV Continuous <Continuous>  dextrose 5%. 1000 milliLiter(s) (100 mL/Hr) IV Continuous <Continuous>  dextrose 50% Injectable 25 Gram(s) IV Push once  dextrose 50% Injectable 12.5 Gram(s) IV Push once  dextrose 50% Injectable 25 Gram(s) IV Push once  enoxaparin Injectable 40 milliGRAM(s) SubCutaneous daily  glucagon  Injectable 1 milliGRAM(s) IntraMuscular once  hydrochlorothiazide 12.5 milliGRAM(s) Oral daily  insulin lispro (ADMELOG) corrective regimen sliding scale   SubCutaneous three times a day before meals  insulin lispro (ADMELOG) corrective regimen sliding scale   SubCutaneous at bedtime  losartan 100 milliGRAM(s) Oral daily  remdesivir  IVPB   IV Intermittent   remdesivir  IVPB 100 milliGRAM(s) IV Intermittent every 24 hours    MEDICATIONS  (PRN):  acetaminophen   Tablet .. 650 milliGRAM(s) Oral every 6 hours PRN Temp greater or equal to 38C (100.4F), Mild Pain (1 - 3)    LABS:    04-12    141  |  109<H>  |  15  ----------------------------<  135<H>  4.4   |  28  |  0.93    Ca    8.3<L>      12 Apr 2021 09:20  Phos  3.0     04-12  Mg     2.3     04-12    TPro  6.0  /  Alb  2.9<L>  /  TBili  0.3  /  DBili  .12  /  AST  31  /  ALT  33  /  AlkPhos  58  04-13    PT/INR - ( 13 Apr 2021 07:54 )   PT: 13.2 sec;   INR: 1.15 ratio       ASSESSMENT AND PLAN:  ·	COVID Pneumonia.  ·	Hypotension.  ·	Dehydration.  ·	HTN.    SPO2 in low 90s on room air.  Continue Remdesivir and Dexamethasone

## 2021-04-15 ENCOUNTER — TRANSCRIPTION ENCOUNTER (OUTPATIENT)
Age: 51
End: 2021-04-15

## 2021-04-20 DIAGNOSIS — R55 SYNCOPE AND COLLAPSE: ICD-10-CM

## 2021-04-20 DIAGNOSIS — J96.01 ACUTE RESPIRATORY FAILURE WITH HYPOXIA: ICD-10-CM

## 2021-04-20 DIAGNOSIS — I95.9 HYPOTENSION, UNSPECIFIED: ICD-10-CM

## 2021-04-20 DIAGNOSIS — I10 ESSENTIAL (PRIMARY) HYPERTENSION: ICD-10-CM

## 2021-04-20 DIAGNOSIS — E86.0 DEHYDRATION: ICD-10-CM

## 2021-04-20 DIAGNOSIS — J12.82 PNEUMONIA DUE TO CORONAVIRUS DISEASE 2019: ICD-10-CM

## 2021-04-20 DIAGNOSIS — U07.1 COVID-19: ICD-10-CM

## 2021-04-20 DIAGNOSIS — E11.9 TYPE 2 DIABETES MELLITUS WITHOUT COMPLICATIONS: ICD-10-CM

## 2021-06-22 ENCOUNTER — TRANSCRIPTION ENCOUNTER (OUTPATIENT)
Age: 51
End: 2021-06-22

## 2021-07-24 ENCOUNTER — TRANSCRIPTION ENCOUNTER (OUTPATIENT)
Age: 51
End: 2021-07-24

## 2021-10-16 ENCOUNTER — TRANSCRIPTION ENCOUNTER (OUTPATIENT)
Age: 51
End: 2021-10-16

## 2023-09-29 NOTE — ASU DISCHARGE PLAN (ADULT/PEDIATRIC) - ASU DC REMOVE DRESSINGFT
70 Oxybutynin Counseling:  I discussed with the patient the risks of oxybutynin including but not limited to skin rash, drowsiness, dry mouth, difficulty urinating, and blurred vision.

## 2024-12-18 ENCOUNTER — NON-APPOINTMENT (OUTPATIENT)
Age: 54
End: 2024-12-18

## 2024-12-19 ENCOUNTER — APPOINTMENT (OUTPATIENT)
Dept: ORTHOPEDIC SURGERY | Facility: CLINIC | Age: 54
End: 2024-12-19
Payer: OTHER MISCELLANEOUS

## 2024-12-19 DIAGNOSIS — M67.912 UNSPECIFIED DISORDER OF SYNOVIUM AND TENDON, LEFT SHOULDER: ICD-10-CM

## 2024-12-19 DIAGNOSIS — M25.562 PAIN IN LEFT KNEE: ICD-10-CM

## 2024-12-19 DIAGNOSIS — M17.12 UNILATERAL PRIMARY OSTEOARTHRITIS, LEFT KNEE: ICD-10-CM

## 2024-12-19 DIAGNOSIS — M16.12 UNILATERAL PRIMARY OSTEOARTHRITIS, LEFT HIP: ICD-10-CM

## 2024-12-19 PROCEDURE — 73562 X-RAY EXAM OF KNEE 3: CPT | Mod: LT

## 2024-12-19 PROCEDURE — 99204 OFFICE O/P NEW MOD 45 MIN: CPT

## 2024-12-19 RX ORDER — DICLOFENAC SODIUM 75 MG/1
75 TABLET, DELAYED RELEASE ORAL
Qty: 1 | Refills: 0 | Status: ACTIVE | COMMUNITY
Start: 2024-12-19 | End: 1900-01-01

## 2025-01-02 ENCOUNTER — APPOINTMENT (OUTPATIENT)
Dept: ORTHOPEDIC SURGERY | Facility: CLINIC | Age: 55
End: 2025-01-02
Payer: OTHER MISCELLANEOUS

## 2025-01-02 DIAGNOSIS — M67.912 UNSPECIFIED DISORDER OF SYNOVIUM AND TENDON, LEFT SHOULDER: ICD-10-CM

## 2025-01-02 DIAGNOSIS — M62.830 MUSCLE SPASM OF BACK: ICD-10-CM

## 2025-01-02 DIAGNOSIS — M62.838 OTHER MUSCLE SPASM: ICD-10-CM

## 2025-01-02 DIAGNOSIS — M17.12 UNILATERAL PRIMARY OSTEOARTHRITIS, LEFT KNEE: ICD-10-CM

## 2025-01-02 DIAGNOSIS — M16.12 UNILATERAL PRIMARY OSTEOARTHRITIS, LEFT HIP: ICD-10-CM

## 2025-01-02 PROCEDURE — 99214 OFFICE O/P EST MOD 30 MIN: CPT

## 2025-01-29 ENCOUNTER — NON-APPOINTMENT (OUTPATIENT)
Age: 55
End: 2025-01-29

## 2025-01-29 ENCOUNTER — APPOINTMENT (OUTPATIENT)
Dept: ORTHOPEDIC SURGERY | Facility: CLINIC | Age: 55
End: 2025-01-29
Payer: OTHER MISCELLANEOUS

## 2025-01-29 VITALS — WEIGHT: 235 LBS | HEIGHT: 69 IN | BODY MASS INDEX: 34.8 KG/M2

## 2025-01-29 DIAGNOSIS — M70.62 TROCHANTERIC BURSITIS, LEFT HIP: ICD-10-CM

## 2025-01-29 DIAGNOSIS — M67.912 UNSPECIFIED DISORDER OF SYNOVIUM AND TENDON, LEFT SHOULDER: ICD-10-CM

## 2025-01-29 PROCEDURE — 20610 DRAIN/INJ JOINT/BURSA W/O US: CPT | Mod: LT

## 2025-01-29 PROCEDURE — 99214 OFFICE O/P EST MOD 30 MIN: CPT | Mod: 25

## 2025-02-05 ENCOUNTER — NON-APPOINTMENT (OUTPATIENT)
Age: 55
End: 2025-02-05

## 2025-02-05 ENCOUNTER — APPOINTMENT (OUTPATIENT)
Dept: ORTHOPEDIC SURGERY | Facility: CLINIC | Age: 55
End: 2025-02-05

## 2025-02-22 ENCOUNTER — EMERGENCY (EMERGENCY)
Facility: HOSPITAL | Age: 55
LOS: 0 days | Discharge: HOME | End: 2025-02-22
Attending: STUDENT IN AN ORGANIZED HEALTH CARE EDUCATION/TRAINING PROGRAM
Payer: COMMERCIAL

## 2025-02-22 VITALS
HEART RATE: 72 BPM | DIASTOLIC BLOOD PRESSURE: 82 MMHG | SYSTOLIC BLOOD PRESSURE: 129 MMHG | OXYGEN SATURATION: 96 % | TEMPERATURE: 98 F | RESPIRATION RATE: 18 BRPM

## 2025-02-22 VITALS
HEIGHT: 69 IN | SYSTOLIC BLOOD PRESSURE: 164 MMHG | RESPIRATION RATE: 16 BRPM | TEMPERATURE: 98 F | WEIGHT: 240.97 LBS | OXYGEN SATURATION: 96 % | HEART RATE: 91 BPM | DIASTOLIC BLOOD PRESSURE: 94 MMHG

## 2025-02-22 DIAGNOSIS — Z98.890 OTHER SPECIFIED POSTPROCEDURAL STATES: Chronic | ICD-10-CM

## 2025-02-22 DIAGNOSIS — R07.89 OTHER CHEST PAIN: ICD-10-CM

## 2025-02-22 DIAGNOSIS — I10 ESSENTIAL (PRIMARY) HYPERTENSION: ICD-10-CM

## 2025-02-22 DIAGNOSIS — R07.9 CHEST PAIN, UNSPECIFIED: ICD-10-CM

## 2025-02-22 LAB
ALBUMIN SERPL ELPH-MCNC: 3.7 G/DL — SIGNIFICANT CHANGE UP (ref 3.3–5)
ALP SERPL-CCNC: 92 U/L — SIGNIFICANT CHANGE UP (ref 40–120)
ALT FLD-CCNC: 23 U/L — SIGNIFICANT CHANGE UP (ref 12–78)
ANION GAP SERPL CALC-SCNC: 8 MMOL/L — SIGNIFICANT CHANGE UP (ref 5–17)
APTT BLD: 33.2 SEC — SIGNIFICANT CHANGE UP (ref 24.5–35.6)
AST SERPL-CCNC: 14 U/L — LOW (ref 15–37)
BASOPHILS # BLD AUTO: 0.04 K/UL — SIGNIFICANT CHANGE UP (ref 0–0.2)
BASOPHILS NFR BLD AUTO: 0.7 % — SIGNIFICANT CHANGE UP (ref 0–2)
BILIRUB SERPL-MCNC: 0.3 MG/DL — SIGNIFICANT CHANGE UP (ref 0.2–1.2)
BUN SERPL-MCNC: 15 MG/DL — SIGNIFICANT CHANGE UP (ref 7–23)
CALCIUM SERPL-MCNC: 8.7 MG/DL — SIGNIFICANT CHANGE UP (ref 8.5–10.1)
CHLORIDE SERPL-SCNC: 104 MMOL/L — SIGNIFICANT CHANGE UP (ref 96–108)
CO2 SERPL-SCNC: 31 MMOL/L — SIGNIFICANT CHANGE UP (ref 22–31)
CREAT SERPL-MCNC: 1.24 MG/DL — SIGNIFICANT CHANGE UP (ref 0.5–1.3)
D DIMER BLD IA.RAPID-MCNC: <150 NG/ML DDU — SIGNIFICANT CHANGE UP
EGFR: 68 ML/MIN/1.73M2 — SIGNIFICANT CHANGE UP
EOSINOPHIL # BLD AUTO: 0.13 K/UL — SIGNIFICANT CHANGE UP (ref 0–0.5)
EOSINOPHIL NFR BLD AUTO: 2.3 % — SIGNIFICANT CHANGE UP (ref 0–6)
GLUCOSE SERPL-MCNC: 228 MG/DL — HIGH (ref 70–99)
HCT VFR BLD CALC: 39.3 % — SIGNIFICANT CHANGE UP (ref 39–50)
HGB BLD-MCNC: 13.3 G/DL — SIGNIFICANT CHANGE UP (ref 13–17)
IMM GRANULOCYTES NFR BLD AUTO: 0.2 % — SIGNIFICANT CHANGE UP (ref 0–0.9)
INR BLD: 0.97 RATIO — SIGNIFICANT CHANGE UP (ref 0.85–1.16)
LYMPHOCYTES # BLD AUTO: 1.6 K/UL — SIGNIFICANT CHANGE UP (ref 1–3.3)
LYMPHOCYTES # BLD AUTO: 27.9 % — SIGNIFICANT CHANGE UP (ref 13–44)
MCHC RBC-ENTMCNC: 27.8 PG — SIGNIFICANT CHANGE UP (ref 27–34)
MCHC RBC-ENTMCNC: 33.8 G/DL — SIGNIFICANT CHANGE UP (ref 32–36)
MCV RBC AUTO: 82 FL — SIGNIFICANT CHANGE UP (ref 80–100)
MONOCYTES # BLD AUTO: 0.4 K/UL — SIGNIFICANT CHANGE UP (ref 0–0.9)
MONOCYTES NFR BLD AUTO: 7 % — SIGNIFICANT CHANGE UP (ref 2–14)
NEUTROPHILS # BLD AUTO: 3.56 K/UL — SIGNIFICANT CHANGE UP (ref 1.8–7.4)
NEUTROPHILS NFR BLD AUTO: 61.9 % — SIGNIFICANT CHANGE UP (ref 43–77)
NRBC BLD AUTO-RTO: 0 /100 WBCS — SIGNIFICANT CHANGE UP (ref 0–0)
PLATELET # BLD AUTO: 224 K/UL — SIGNIFICANT CHANGE UP (ref 150–400)
POTASSIUM SERPL-MCNC: 3 MMOL/L — LOW (ref 3.5–5.3)
POTASSIUM SERPL-SCNC: 3 MMOL/L — LOW (ref 3.5–5.3)
PROT SERPL-MCNC: 6.9 GM/DL — SIGNIFICANT CHANGE UP (ref 6–8.3)
PROTHROM AB SERPL-ACNC: 11.3 SEC — SIGNIFICANT CHANGE UP (ref 9.9–13.4)
RBC # BLD: 4.79 M/UL — SIGNIFICANT CHANGE UP (ref 4.2–5.8)
RBC # FLD: 13.2 % — SIGNIFICANT CHANGE UP (ref 10.3–14.5)
SODIUM SERPL-SCNC: 143 MMOL/L — SIGNIFICANT CHANGE UP (ref 135–145)
TROPONIN I, HIGH SENSITIVITY RESULT: 9.3 NG/L — SIGNIFICANT CHANGE UP
WBC # BLD: 5.74 K/UL — SIGNIFICANT CHANGE UP (ref 3.8–10.5)
WBC # FLD AUTO: 5.74 K/UL — SIGNIFICANT CHANGE UP (ref 3.8–10.5)

## 2025-02-22 PROCEDURE — 93010 ELECTROCARDIOGRAM REPORT: CPT

## 2025-02-22 PROCEDURE — 99285 EMERGENCY DEPT VISIT HI MDM: CPT

## 2025-02-22 PROCEDURE — 71046 X-RAY EXAM CHEST 2 VIEWS: CPT | Mod: 26

## 2025-02-22 RX ORDER — ACETAMINOPHEN 160 MG/5ML
500 SUSPENSION ORAL ONCE
Refills: 0 | Status: COMPLETED | OUTPATIENT
Start: 2025-02-22 | End: 2025-02-22

## 2025-02-22 RX ORDER — KETOROLAC TROMETHAMINE 10 MG
15 TABLET ORAL ONCE
Refills: 0 | Status: DISCONTINUED | OUTPATIENT
Start: 2025-02-22 | End: 2025-02-22

## 2025-02-22 RX ORDER — POTASSIUM CHLORIDE 750 MG/1
40 TABLET, EXTENDED RELEASE ORAL ONCE
Refills: 0 | Status: COMPLETED | OUTPATIENT
Start: 2025-02-22 | End: 2025-02-22

## 2025-02-22 RX ADMIN — Medication 15 MILLIGRAM(S): at 02:10

## 2025-02-22 RX ADMIN — POTASSIUM CHLORIDE 40 MILLIEQUIVALENT(S): 750 TABLET, EXTENDED RELEASE ORAL at 03:06

## 2025-02-22 RX ADMIN — ACETAMINOPHEN 500 MILLIGRAM(S): 160 SUSPENSION ORAL at 03:06

## 2025-02-22 NOTE — ED PROVIDER NOTE - OBJECTIVE STATEMENT
55 y/o M hx of HTN presents w/ chest pain for past 2 days. midline R side. endorsing having L shoulder tear that he needs surgery for, is going to PT. worse w/ touching the area and taking a breath. non-exertional. took tylenol yesterday which helped take away the pain. denies fever/chills. denies nausea/vomiting. denies travel. denies recent surgeries. denies drug use.

## 2025-02-22 NOTE — ED PROVIDER NOTE - CARE PROVIDERS DIRECT ADDRESSES
,[email or web address],[email or web address],DirectAddress_Unknown

## 2025-02-22 NOTE — ED PROVIDER NOTE - CLINICAL SUMMARY MEDICAL DECISION MAKING FREE TEXT BOX
55 y/o M hx of HTN presents w/ chest pain for past 2 days. midline R side. endorsing having L shoulder tear that he needs surgery for, is going to PT. worse w/ touching the area and taking a breath. non-exertional. took tylenol yesterday which helped take away the pain. denies fever/chills. denies nausea/vomiting. denies travel. denies recent surgeries. denies drug use.   +tender over R side of chest on exam, no ecchymosis, lungs ctab, well appearing, not diaphoretic  EKG NSR, no stemi   heart score is low   check trop x 1 , labs, cxr.   likely msk, lower suspciion of cardiac/acs. 53 y/o M hx of HTN presents w/ chest pain for past 2 days. midline R side. endorsing having L shoulder tear that he needs surgery for, is going to PT. worse w/ touching the area and taking a breath. non-exertional. took tylenol yesterday which helped take away the pain. denies fever/chills. denies nausea/vomiting. denies travel. denies recent surgeries. denies drug use.   +tender over R side of chest on exam, no ecchymosis, lungs ctab, well appearing, not diaphoretic  EKG NSR, no stemi   heart score is low   check trop x 1 , labs, cxr.   likely msk, lower suspciion of cardiac/acs.    chest pain resolved s/p tylenol. f/u outpatient w/ cards, all questions answered.  -Edgar   patient requesting orthopedic names on discharge as he would like more options to consider for his known shoulder injury. potassium repleted.

## 2025-02-22 NOTE — ED PROVIDER NOTE - PHYSICAL EXAMINATION
General: Well appearing male in no acute distress  HEENT: Normocephalic, atraumatic. Moist mucous membranes. Oropharynx clear. No lymphadenopathy.  Eyes: No scleral icterus. EOMI. MICHELLE.  Neck:. Soft and supple. Full ROM without pain. No midline tenderness  Cardiac: Regular rate and regular rhythm. No murmurs, rubs, gallops. Peripheral pulses 2+ and symmetric. No LE edema.  Resp: Lungs CTAB. Speaking in full sentences. No wheezes, rales or rhonchi.  Abd: Soft, non-tender, non-distended. No guarding or rebound. No scars, masses, or lesions.  Back: Spine midline and non-tender. No CVA tenderness.    Skin: No rashes, abrasions, or lacerations.  Neuro: AO x 3. Moves all extremities symmetrically. Motor strength and sensation grossly intact.

## 2025-02-22 NOTE — ED PROVIDER NOTE - NSFOLLOWUPINSTRUCTIONS_ED_ALL_ED_FT
can followup with primary/cardiologist in next 7 days  followup with your orthopedic for your known shoulder injury.   can take acetaminophen over the counter for pain only as needed.     Chest Pain    Chest pain can be caused by many different conditions which may or may not be dangerous. Causes include heartburn, lung infections, heart attack, blood clot in lungs, skin infections, strain or damage to muscle, cartilage, or bones, etc. In addition to a history and physical examination, an electrocardiogram (ECG) or other lab tests may have been performed to determine the cause of your chest pain. Follow up with your primary care provider or with a cardiologist as instructed.     SEEK IMMEDIATE MEDICAL CARE IF YOU HAVE ANY OF THE FOLLOWING SYMPTOMS: worsening chest pain, coughing up blood, unexplained back/neck/jaw pain, severe abdominal pain, dizziness or lightheadedness, fainting, shortness of breath, sweaty or clammy skin, vomiting, or racing heart beat. These symptoms may represent a serious problem that is an emergency. Do not wait to see if the symptoms will go away. Get medical help right away. Call 911 and do not drive yourself to the hospital.

## 2025-02-22 NOTE — ED PROVIDER NOTE - PATIENT PORTAL LINK FT
You can access the FollowMyHealth Patient Portal offered by [facility] by registering at the following website: [email or web address]. By joining [facility]’s FollowMyHealth portal, you will also be able to view your health information using other applications (apps) compatible with our system.
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
## 2025-02-22 NOTE — ED PROVIDER NOTE - PROVIDER TOKENS
PROVIDER:[TOKEN:[[id number]],FOLLOWUP:[4-6 Days]],PROVIDER:[TOKEN:[[id number]],FOLLOWUP:[4-6 Days]],PROVIDER:[TOKEN:[[id number]],FOLLOWUP:[4-6 Days]],PROVIDER:[TOKEN:[[id number]],FOLLOWUP:[4-6 Days]],PROVIDER:[TOKEN:[[id number]],FOLLOWUP:[4-6 Days]],PROVIDER:[TOKEN:[[id number]],FOLLOWUP:[4-6 Days]]

## 2025-02-22 NOTE — ED PROVIDER NOTE - CARE PROVIDER_API CALL
David Calixto  Cardiovascular Disease  [address] [place] [address]  Phone: [phone]  Fax: [phone]  Follow Up Time: 4-6 Days    Dipak Jabier Jason  Cardiovascular Disease  [address] [place] [address]  Phone: [phone]  Fax: [phone]  Follow Up Time: 4-6 Days    Titus Matos  Internal Medicine  [address] [place] [address]  Phone: [phone]  Fax: [phone]  Follow Up Time: 4-6 Days    Perez Campos  Orthopaedic Sports Medicine  [address], [address]  [place] [address]  Phone: [phone]  Fax: [phone]  Follow Up Time: 4-6 Days    Boni Rodríguezore  Orthopaedic Surgery  [address]  [place] [address]  Phone: [phone]  Fax: [phone]  Follow Up Time: 4-6 Days    Dalton Lanier  Orthopaedic Sports Medicine  [address], [address]  [place] [address]  Phone: [phone]  Fax: [phone]  Follow Up Time: 4-6 Days

## 2025-02-22 NOTE — ED ADULT NURSE NOTE - OBJECTIVE STATEMENT
Pt complains of midsternal chest pain while laying bed. States  it first started on Monday and went away but came back tonight.

## 2025-03-05 ENCOUNTER — NON-APPOINTMENT (OUTPATIENT)
Age: 55
End: 2025-03-05

## 2025-03-12 ENCOUNTER — APPOINTMENT (OUTPATIENT)
Dept: ORTHOPEDIC SURGERY | Facility: CLINIC | Age: 55
End: 2025-03-12
Payer: OTHER MISCELLANEOUS

## 2025-03-12 VITALS
WEIGHT: 240 LBS | BODY MASS INDEX: 35.55 KG/M2 | HEIGHT: 69 IN | HEART RATE: 78 BPM | DIASTOLIC BLOOD PRESSURE: 93 MMHG | SYSTOLIC BLOOD PRESSURE: 145 MMHG

## 2025-03-12 DIAGNOSIS — M16.12 UNILATERAL PRIMARY OSTEOARTHRITIS, LEFT HIP: ICD-10-CM

## 2025-03-12 DIAGNOSIS — M70.62 TROCHANTERIC BURSITIS, LEFT HIP: ICD-10-CM

## 2025-03-12 DIAGNOSIS — M17.12 UNILATERAL PRIMARY OSTEOARTHRITIS, LEFT KNEE: ICD-10-CM

## 2025-03-12 PROCEDURE — 99213 OFFICE O/P EST LOW 20 MIN: CPT

## 2025-04-02 ENCOUNTER — APPOINTMENT (OUTPATIENT)
Dept: ORTHOPEDIC SURGERY | Facility: CLINIC | Age: 55
End: 2025-04-02
Payer: OTHER MISCELLANEOUS

## 2025-04-02 VITALS — WEIGHT: 240 LBS | HEIGHT: 69 IN | BODY MASS INDEX: 35.55 KG/M2

## 2025-04-02 DIAGNOSIS — M16.12 UNILATERAL PRIMARY OSTEOARTHRITIS, LEFT HIP: ICD-10-CM

## 2025-04-02 PROCEDURE — 20611 DRAIN/INJ JOINT/BURSA W/US: CPT | Mod: LT

## 2025-04-02 PROCEDURE — 99214 OFFICE O/P EST MOD 30 MIN: CPT | Mod: 25

## 2025-06-05 ENCOUNTER — EMERGENCY (EMERGENCY)
Facility: HOSPITAL | Age: 55
LOS: 0 days | Discharge: HOME | End: 2025-06-05
Attending: STUDENT IN AN ORGANIZED HEALTH CARE EDUCATION/TRAINING PROGRAM
Payer: COMMERCIAL

## 2025-06-05 VITALS — OXYGEN SATURATION: 97 % | HEART RATE: 103 BPM

## 2025-06-05 VITALS
TEMPERATURE: 99 F | HEART RATE: 96 BPM | SYSTOLIC BLOOD PRESSURE: 163 MMHG | OXYGEN SATURATION: 96 % | RESPIRATION RATE: 19 BRPM | DIASTOLIC BLOOD PRESSURE: 91 MMHG

## 2025-06-05 DIAGNOSIS — Z98.890 OTHER SPECIFIED POSTPROCEDURAL STATES: Chronic | ICD-10-CM
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
## 2025-06-05 DIAGNOSIS — I10 ESSENTIAL (PRIMARY) HYPERTENSION: ICD-10-CM
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
## 2025-06-05 DIAGNOSIS — S00.81XA ABRASION OF OTHER PART OF HEAD, INITIAL ENCOUNTER: ICD-10-CM

## 2025-06-05 PROCEDURE — 99283 EMERGENCY DEPT VISIT LOW MDM: CPT

## 2025-06-05 NOTE — ED PROVIDER NOTE - PHYSICAL EXAMINATION
General: Appears well and nontoxic  Mentation: A&O x 3  psych: mood appropriate  HEENT: airway patent, conjunctivae clear bilaterally, pupils equal and reactive to light, extraocular movements intact, fluorescein exam shows no corneal abrasions  Resp: symmetric chest rise, no resp distress, breath sounds CTA bilaterally  Cardio: RRR, no m/r/g  GI: soft/nondistended/nontender  : no CVA tenderness  Neuro: sensation and motor function grossly intact  Skin: no cyanosis, no jaundice, no abrasions or lacerations on the skin of the forehead  MSK: normal movement of all extremities  Lymph/Vasc: no YESSY edema General: Appears well and nontoxic  Mentation: A&O x 3  psych: mood appropriate  HEENT: airway patent, conjunctivae clear bilaterally, pupils equal and reactive to light, extraocular movements intact, fluorescein exam shows no corneal abrasions  Resp: symmetric chest rise, no resp distress, breath sounds CTA bilaterally  Cardio: RRR, no m/r/g  GI: soft/nondistended/nontender  : no CVA tenderness  Neuro: sensation and motor function grossly intact  Skin: no cyanosis, no jaundice, no lacerations on the skin of the forehead, one pinpoint skin abrasion on left forehead  MSK: normal movement of all extremities  Lymph/Vasc: no YESSY edema
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
## 2025-06-05 NOTE — ED PROVIDER NOTE - NSFOLLOWUPINSTRUCTIONS_ED_ALL_ED_FT
Please follow up with your primary care physician within the next 4-6 days.    Please continue taking your medications as prescribed by your doctors.     Please return to the emergency department if you experience any of the following symptoms:    Fever  Chest pain  Difficulty breathing  Abdominal pain  Nausea  Vomiting   Changes in vision  Worsening symptoms
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
## 2025-06-05 NOTE — ED ADULT NURSE NOTE - PAIN: BODY LOCATION
"[facility] ONC [facility] GROUP HEMATOLOGY AND ONCOLOGY  [address]  [place] [address] [phone]    Patient Name: Parker Arnold  Encounter Date: 01/17/2025  YOB: 1956  Patient Number: [id number]     REASON FOR VISIT:  Parker \"Magdiel\" Jaxson Arnold is a 68 y.o. male with a diagnosis of clear cell renal cancer in 11/11/2014 (5.5 cm G3, pT3a, pNx).  Underwent right nephrectomy. Was under surveillance until 11/26/2018  When he underwent  wedge resection of a right pulm nodule. Pathology:  metastatic renal cell carcinoma. He was not able to tolerate Sutent due to kidney failure, thus received ipilumomab/nivolumab, 05/13/2019 - 08/07/2019, then single agent nivolumab, 08/28/2019 - 09/09/2019 (58 months since cessation of therapy). Therapy stopped due to renal failure (nivolumab?). He has been on and off prednisone since.  Last 11/29/2021 (37.5 months) underwent right thoracoscopic RLL and RML wedge resection with final path consistent with metastatic renal cell carcinoma.  On 12/7/23 he was seen in follow-up by Dr. Cannon ( oncology, [facility]) who recommended a trial of cabozantinib.  Thus, at his visit on 1/23/24 palliative cabozantinib was sent in but due to insurance issues did not receive the drug until 2/21/24.  However, on 4/2/24 the patient decided to stop the drug due to intolerable AEs (Hypertension and feeling \"clumsy, possibly dizzy/lightheaded, fatigue, constipation, diarrhea\").  He is here with his spouse, Nelda.     I have reviewed the HPI and verified with the patient the accuracy of it. No changes to interval history since the information was documented. Leonardo Smith MD 01/17/25      Problem List Items Addressed This Visit          Other    Renal cell carcinoma of right kidney - Primary    Overview     Overview:   Diagnoses 11/11/14. Path report in C.E at [facility]. Tissue has been requested. "                     Oncology/Hematology History   Renal cell carcinoma of right kidney   10/2014 Initial Diagnosis    Renal cell carcinoma (CMS/HCC)     10/2014 Surgery    Right nephrectomy, Dr Morales       10/1/2018 Progression    Increasing lung Nodules on f/u CT     10/2/2018 -  Chemotherapy    Sutent  Discontinued due to intolerance     5/13/2019 - 8/7/2019 Chemotherapy    Opdivo Yervoy x 4 cycles     8/28/2019 - 9/9/2019 Chemotherapy    Single agent Opdivo    Opdivo held 9/9/2019 due to Renal Failure / NephritisToxicity     1/16/2020 Imaging    PET SCAN  Negative for neoplastic uptake     4/2/2024 -  Chemotherapy    OP SUPPORTIVE HYDRATION + ANTIEMETICS     4/9/2024 - 4/9/2024 Chemotherapy    [facility] - Cabozantinib (Cabometyx)     Lung metastases   10/18/2019 Initial Diagnosis    Lung metastases     4/9/2024 - 4/9/2024 Chemotherapy    [facility] - Cabozantinib (Cabometyx)         PAST MEDICAL HISTORY:  ALLERGIES:  Allergies   Allergen Reactions    Amoxicillin Hives    Contrast Dye (Echo Or Unknown Ct/Mr) Other (See Comments)     No Oral or IV contrast Due to only having 1 kidney    Opdivo [Nivolumab] Provider Review Needed     Renal failure    Penicillins Hives and Rash    Clindamycin/Lincomycin Rash     pustules  Rash - pustules      Doxycycline Rash    Hepatitis B Virus Vaccines Rash     Pustules      Hepatitis B Vaccine Rash    Latex Rash    Levofloxacin Rash     CURRENT MEDICATIONS:  Outpatient Encounter Medications as of 1/17/2025   Medication Sig Dispense Refill    acetaminophen (TYLENOL) 500 MG tablet Take 1 tablet by mouth Every 6 (Six) Hours As Needed for Mild Pain.      allopurinol (ZYLOPRIM) 100 MG tablet Take 1 tablet by mouth Daily.      calcium carb-cholecalciferol 600-800 MG-UNIT tablet Take 1 tablet by mouth 2 (Two) Times a Day With Meals.      carvedilol (COREG) 6.25 MG tablet Take 1 tablet by mouth 2 (Two) Times a Day With Meals.      finasteride (PROSCAR) 5 MG tablet Take 1  tablet by mouth Daily.  3    loratadine (CLARITIN) 10 MG tablet Take 1 tablet every day by oral route.      predniSONE (DELTASONE) 5 MG tablet Take 0.5 tablets by mouth Daily.      sodium bicarbonate 650 MG tablet Take 1 tablet by mouth 3 (Three) Times a Day. 90 tablet 2     No facility-administered encounter medications on file as of 1/17/2025.     ADULT ILLNESSES:  Patient Active Problem List   Diagnosis Code    Renal cell carcinoma of right kidney C64.1    Multiple nodules of lung R91.8    Lung anomaly Q33.9    TIA (transient ischemic attack) G45.9    Acute renal failure with tubular necrosis in the setting of solitary kidney N17.0    Lung metastases C78.00    Pyuria R82.81    Hyperkalemia E87.5    Metabolic acidosis E87.20    Hyperglycemia R73.9    Hypercalcemia E83.52    Benign prostatic hyperplasia N40.0    Increased frequency of urination R35.0    Nocturia R35.1    Retention of urine R33.9    Dehydration E86.0    Nephritis and nephropathy N05.9    Toxicity, chemicals T65.91XA    Stage 3 chronic kidney disease N18.30    Campylobacter gastroenteritis A04.5    Sigmoid diverticulitis K57.32    History of TIA (transient ischemic attack) Z86.73    Solitary kidney, acquired Z90.5    Benign essential hypertension I10    Gastroesophageal reflux disease K21.9    Encounter for screening for malignant neoplasm of colon Z12.11    Hypertension due to drug I15.8, T50.905A     SURGERIES:  Past Surgical History:   Procedure Laterality Date    BRONCHOSCOPY N/A 11/29/2021    Procedure: BRONCHOSCOPY;  Surgeon: Jarrod Tolentino MD;  Location: [facility] OR;  Service: Cardiothoracic;  Laterality: N/A;    CHOLECYSTECTOMY WITH INTRAOPERATIVE CHOLANGIOGRAM N/A 12/22/2021    Procedure: LAPAROSCOPIC CHOLECYSTECTOMY;  Surgeon: Vandana Calhoun MD;  Location: [facility] OR;  Service: General;  Laterality: N/A;    COLONOSCOPY N/A 4/18/2022    Procedure: COLONOSCOPY WITH ANESTHESIA;  Surgeon: Zoila Robison MD;  Location: [facility] ENDOSCOPY;   Service: Gastroenterology;  Laterality: N/A;  pre colon cancer  post  Dr. Osborne    HERNIA REPAIR  2008    LUNG BIOPSY  2018    NEPHRECTOMY Right 2014    SINUS SURGERY  2005    THORACOSCOPY Right 2021    Procedure: RIGHT THORACOSCOPY WITH RIGHT MIDDLE AND RIGHT LOWER LOBE WEDGE RESECTIONS;  Surgeon: Jarrod Tolentino MD;  Location: [facility] OR;  Service: Cardiothoracic;  Laterality: Right;     HEALTH MAINTENANCE ITEMS:  Health Maintenance Due   Topic Date Due    TDAP/TD VACCINES (1 - Tdap) Never done    ZOSTER VACCINE (1 of 2) Never done    Pneumococcal Vaccine 65+ (1 of 1 - PCV) Never done    HEPATITIS C SCREENING  Never done    ANNUAL WELLNESS VISIT  Never done    BMI FOLLOWUP  2023    INFLUENZA VACCINE  Never done    COVID-19 Vaccine ( - - season) Never done       <no information>  Last Completed Colonoscopy            COLORECTAL CANCER SCREENING (COLONOSCOPY - Every 5 Years) Next due on 2022  COLONOSCOPY    2022  Surgical Procedure: COLONOSCOPY                    There is no immunization history on file for this patient.  Last Completed Mammogram       This patient has no relevant Health Maintenance data.              FAMILY HISTORY:  Family History   Problem Relation Age of Onset    Other Mother         blood disease unknown    Cancer Father         unknown    No Known Problems Sister     No Known Problems Sister     Heart disease Maternal Grandmother     Heart disease Maternal Grandfather     No Known Problems Paternal Grandmother     No Known Problems Paternal Grandfather     Colon polyps Neg Hx     Colon cancer Neg Hx      SOCIAL HISTORY:  Social History     Socioeconomic History    Marital status:    Tobacco Use    Smoking status: Former     Current packs/day: 0.00     Average packs/day: 1 pack/day for 22.0 years (22.0 ttl pk-yrs)     Types: Cigarettes     Start date:      Quit date:      Years since quittin.0    Smokeless tobacco: Never  "  Vaping Use    Vaping status: Never Used   Substance and Sexual Activity    Alcohol use: Not Currently    Drug use: No    Sexual activity: Defer       REVIEW OF SYSTEMS:  Cabozantinib tolerance:  +hypertension.  Started on varying doses of Losartan on 2/26, added Coreg 6.25 bid and HCTZ in conjunction with nephrology.  According to notes from specialty pharmacy, 3/12, 3/18, 4/2/24-. Subjective BP numbers reported: 175/105, 158/98, 170/88.  Reported he felt \"clumsy, possibly dizzy/lightheaded, fatigue, constipation, diarrhea\".  No  arterial/venous thromboembolism, no bleeding, no other GI symptoms (belly pain, viscus perforation, fistula formation, severe diarrhea) no rash (hand-foot syndrome), no jaundice (hepatotoxicity), no edema (nephrotic syndrome), no jaw pains (osteonecrosis of the jaw), no decreased appetite, no nausea, no vomiting, no stomatitis, no dysphonia, no asthenia, no dysgeusia, no dyspnea, no extremity pain, no dyspepsia, no muscle spasms, no cough, no headache, no arthralgia, no xeroderma.  Discussed with Neftaly Solis PharmD on 4/2/24 who discussed with patient (face to face) on that day (see phone note):  Instructed to hold the drug for at least a week, monitor BP, use stool softeners.  Says all the symptoms have resolved off the drug.  On 4/2/24 stopped the drug.  Again states \"I do not want to get back on it\".  Constitutional:  Manages ADLs, chores, errands and driving.  Again drove himself in today.  Appetite is good, \"great.\"  Energy is fairly good.  He remains active.  States he has a new dog that he is enjoying.  He has regained 9 lb (had intentionally lost 15 lb at his prior visit ) since his last visit. No fever, no chills nor drenching nights.  HENT: Negative.  No sore throat.  Has seasonal sinus symptoms/rhinorrhea. No headaches.  Eyes: Negative.    Respiratory:  No SOB at rest nor TENORIO.  No cough. No wheezing. No tobacco use.    Cardiovascular: Negative.  No palpitations.  No " "orthopnea  Gastrointestinal: RUQ pain resolved since cholecystectomy.  No dysphagia.  No nausea.  No vomiting.  No dyspepsia. Since stopping the drug last 4/2/24.  Bowels moving regularly.  Has occasional soft stools.  No overt diarrhea.  No melena.  Has not had hemorrhoidal bleeding.  Has history recurrent diverticulitis, early 06/2021.  Had colonoscopy, 04/18/2022- diverticulosis.  Nonbleeding internal hemorrhoids otherwise normal exam.  Endocrine: No hot flashes.  Genitourinary:  No dysuria.  No incontinence. No hematuria.  Occasional urgency.  \"I still feel like I gotta go when I gotta go sometimes.\"  Nocturia much improved on Finasteride - up 1x- from every 90 min.  Remains on allopurinol.  Musculoskeletal:  No arthralgias.  No edema  Skin: No new rash.    Neurological:  No dizziness.  No facial asymmetry. No headaches.  Mild sensory neuropathy of the hands, not worse  Hematological: Negative for new adenopathy.  Says he bruises easily.  Psychiatric/Behavioral:  Has baseline anxiety, \"more now.\"  No depression.    VITAL SIGNS: /84   Pulse 68   Temp 97.2 °F (36.2 °C) (Temporal)   Resp 18   Ht 177.8 cm (70\")   Wt 91.5 kg (201 lb 11.2 oz)   SpO2 98%   BMI 28.94 kg/m² Body surface area is 2.09 meters squared.  Pain Score    01/17/25 0901   PainSc: 0-No pain         PHYSICAL EXAMINATION:   General Appearance: Patient is a very pleasant, cooperative, heavyset, modestly kept elderly male who is awake, alert, oriented and in no acute distress. ECOG 0  HEENT: Normocephalic. Sclerae clear, conjunctiva pink, extraocular movements intact  NECK: Supple, no jugular venous distention, thyroid not enlarged.  LYMPH: No cervical, supraclavicular, axillary, or inguinal lymphadenopathy.  LUNGS: Good air movement, no rales, rhonchi, rubs or wheezes with auscultation.  Right thoracoscopic incisions are healed  CARDIO: Regular sinus rhythm, no murmurs, gallops or rubs.  ABDOMEN:  Soft, with no right upper quadrant " tenderness.  Laparoscopy wounds are all healed. No obvious abdominal masses. Bowel sounds positive. No hernia  MUSKEL: No joint swelling, decreased motion, or inflammation  EXTREMS:  No edema, clubbing, cyanosis, No varicose veins.  NEURO: Grossly nonfocal, Gait is coordinated and smooth, Cognition is preserved.  SKIN: No rashes, no ecchymoses, no petechia.  PSYCH: Oriented to time, place and person. Memory is preserved. Mood and affect appear normal         LABS    Lab Results - Last 18 Months   Lab Units 01/02/25  0901 12/04/24  0906 07/05/24  0848 04/09/24  0947 04/01/24  0913 03/25/24  0933 03/18/24  0911 03/11/24  0913 01/23/24  0945 12/07/23  1227   HEMOGLOBIN g/dL 16.0 16.0 15.3 15.8 16.3 16.3 16.9 16.0   < >  --    HEMATOCRIT % 49.5 49.3 45.8 47.1 48.7 49.0 50.7 47.5   < >  --    MCV fL 92.5 94.1* 92.7 89.7 89.2 88.6 87.6 88.0   < >  --    WBC 10*3/mm3 6.66 6.7 5.50 4.94 5.89 5.73 5.95 6.34   < >  --    RDW % 13.5 13.2 12.6 14.7 14.5 14.1 13.9 13.6   < >  --    MPV fL 9.5 9.4 9.4 9.0 9.4 9.6 9.4 9.3   < >  --    PLATELETS 10*3/mm3 164 155 169 154 164 147 154 172   < >  --    IMM GRAN % % 0.2  --  0.4 0.2 0.2  --  0.3 0.2   < >  --    NEUTROS ABS 10*3/mm3 3.92 3.6 3.07 2.97 3.10 2.85 3.50 3.68   < > 5.3   LYMPHS ABS 10*3/mm3 1.61 1.6 1.45 1.22 1.83 1.88 1.58 1.73   < >  --    MONOS ABS 10*3/mm3 0.79 1.00* 0.61 0.44 0.64 0.63 0.53 0.59   < >  --    EOS ABS 10*3/mm3 0.29 0.50 0.32 0.27 0.28 0.35 0.29 0.30   < > 0.3   BASOS ABS 10*3/mm3 0.04 0.00 0.03 0.03 0.03 0.02 0.03 0.03   < > 0.1   IMMATURE GRANS (ABS) 10*3/mm3 0.01 0.0 0.02 0.01 0.01  --  0.02 0.01   < >  --    NRBC /100 WBC 0.0  --  0.0 0.0 0.0  --  0.0 0.0   < >  --    NEUTROPHIL % %  --   --   --   --   --   --   --   --   --  67.6   MONOCYTES % %  --   --   --   --   --   --   --   --   --  8.7   BASOPHIL % %  --   --   --   --   --   --   --   --   --  0.8    < > = values in this interval not displayed.       Lab Results - Last 18 Months   Lab Units  01/02/25  0901 12/04/24  0906 07/05/24  0848 04/09/24  0947 04/01/24  0913 03/25/24  0933 03/18/24  0911   GLUCOSE mg/dL 106* 96 107* 134* 111* 105* 104*   SODIUM mmol/L 142 140 139 141 141 142 140   POTASSIUM mmol/L 5.1 4.8 4.9 4.7 5.2 4.9 5.5*   TOTAL CO2 mmol/L  --  25  --   --   --   --   --    CO2 mmol/L 25.0  --  25.0 23.0 27.0 26.0 27.0   CHLORIDE mmol/L 107 105 106 108* 104 107 105   ANION GAP mmol/L 10.0 10 8.0 10.0 10.0 9.0 8.0   CREATININE mg/dL 2.10* 2.1* 2.38* 2.47* 2.62* 2.48* 1.99*   BUN mg/dL 33* 33* 43* 48* 56* 45* 39*   BUN / CREAT RATIO  15.7  --  18.1 19.4 21.4 18.1 19.6   CALCIUM mg/dL 9.6 9.7 9.5 9.3 9.9 8.9 10.0   ALK PHOS U/L 69 83 58 89 103 94 104   TOTAL PROTEIN g/dL 6.7 6.7 6.3 6.4 6.8 6.8 6.7   ALT (SGPT) U/L 19 20 17 34 51* 50* 48*   AST (SGOT) U/L 18 18 16 19 31 29 30   BILIRUBIN mg/dL 0.5 0.6 0.6 0.5 0.8 0.5 0.5   ALBUMIN g/dL 4.2 4.5 4.2 4.3 4.4 4.3 4.3   GLOBULIN gm/dL 2.5  --  2.1 2.1 2.4 2.5 2.4       Assessment:  1.  Renal cell carcinoma, diagnosed 11/11/2014 (5.5 cm G3, pT3a, pNx). --2014, right nephrectomy.   --surveillance until 11/26/2018    --11/26/2018-right lower lobe VATS wedge resection: Metastatic renal cell carcinoma, 0.9 cm; margins free of malignancy. Dr. Hu  -- Unable to tolerate Sutent due to kidney failure, thus received ipilumomab/nivolumab, 05/13/2019 - 08/07/2019, then single agent nivolumab, 08/28/2019 - 09/09/2019 (52 months since cessation of therapy). Therapy stopped due to renal failure (nivolumab?)  --01/16/2020-PET scan.  No neoplastic FDG uptake within the chest, abdomen or pelvis.  --07/06/2020-CT abdomen/pelvis with diverticulosis of the colon.  Evidence of acute diverticulitis of the sigmoid colon and adjacent distal descending colon.  No free air or fluid seen.  1 cm noncalcified subpleural nodule in the left lower lobe.  Follow-up exam in 6 months recommended.  --08/04/2020-chest CT.  Scattered subcentimeter bilateral pulmonary nodules similar  to 1/16/2020 with mild increase in the 5 mm left upper lobe nodule.  Continued follow-up recommended.  No pathologic lymphadenopathy.  Right nephrectomy.  --01/14/2020-CT chest.  Increased size of 1.2 cm right lower lobe superior segment pulmonary nodule (previously 0.9 cm)-representing 33% growth.  Multiple other bilateral pulmonary nodules have not significantly changed in size.  Findings are in keeping with disease progression.  --01/14/2020-CT abdomen/pelvis.  No evidence of recurrent or metastatic disease in the abdomen or pelvis.  --01/26/2021-PET/CT.  Impression: Minimal metabolic activity associated with the enlarging pulmonary nodule within the superior segment of the right lower lobe with a maximum SUV of 1.35.  The additional nodules noted within both lungs also demonstrate no abnormal metabolic activity which would favor benignity but continued radiographic follow-up will be suggested.  No scintigraphic evidence of metastatic disease on current exam.  Increased uptake overlying the right groin and more distal external iliac vessels felt to be related to previous hernia repair.  --02/14/2021-  Encounter from Dr. Mayito Arteaga, medical oncology at [facility].  History reviewed.  Most recent CT chest, 01/14/2021 (above) with increasing size of 1.2 cm right lower lobe superior segment pulmonary nodule (previously 0.9 cm) and PET scan, 01/26/2021 (above) noted including minimal metabolic activity associated with the enlarging pulmonary nodule within the superior segment of the right lower lobe.  Discussion: Biopsy right lower lobe lung nodule solitary lesion that is growing on PET scan which will be done at that institution.  Contemplate either surgical resection, ablation or SBRT.  We will focus on local therapy for now.  Multiple systemic options down the road but if renal function improves can contemplate trial with HIF-alpha inhibitor.  --03/15/2021- CT chest.  Continued increased  size of the right lower lobe superior segment now 1.4 cm pulmonary nodule (1.2 cm, 1/14/2021; 0.8 cm, 8/4/2020).  Other pulmonary nodules are stable.  New small area of groundglass opacity in the superior segment left lower lobe may be infectious/inflammatory.  Coronary artery calcifications.  Aortic calcifications.  Small hiatal hernia.  --03/15/2021-CT abdomen/pelvis.  Previous right nephrectomy identified with no evidence of intra-abdominal metastatic disease.  Benign-appearing exophytic cyst posterior aspect of the left kidney, upper pole, measuring 2.47 cm, stable.  Small nodules in the lung bases are unchanged compared to previous chest CT.  Evidence of previous right inguinal hernia repair.  Fatty infiltration of the inguinal canals.  Scattered colonic diverticula without diverticulitis.  Small sliding hiatal hernia identified.  --5/27/2021- Follow-up from GIOVANNY Edward with Dr. Arteaga's office.  Impression/plan: CT CAP, 05/26/2021: Stable examination including stable bilateral lung nodules compared to 03/15/2021.  Progression of right lower lobe lesion?  Discussed systemic therapy option with cabozantinib at 20 mg.  However patient concerned due to his history of substantial AEs in the past.  Will arrange for lesion to be ablated.  However patient says multiple lesions were there for 6 years and unchanged.-Consider ablation or SBRT of the lesion and monitor and consider cabozantinib when failed.  Multiple systemic options down the road but if renal function improves can contemplate HIF-alpha inhibitor.  Continue surveillance for now.  Repeat scans in 3 months.  --06/11/2021-CT chest without contrast.  Stable pulmonary nodules.  No evidence of disease progression.  Interval resolution superior segment left lower lobe groundglass opacity.  Scattered coronary artery calcifications.  --06/11/2021-CT abdomen/pelvis.  History of right renal cell carcinoma nephrectomy.  No CT evidence of tumor recurrence or  metastatic disease in the abdomen or pelvis.  --09/07/2021-CT chest wo contrast.  Pulmonary nodules noted.  Slightly increased in size compared to 6/11/2021 (left upper lobe 7.2 mm-prior 6.7 mm; posterior right lung, 19 mm-prior 13.9 mm; posterior medial right lung, 14 mm-prior 11 mm; right lung, 7.7 mm-prior 7.1 mm).  However no new nodules identified.  --09/07/2021-CT abdomen/pelvis wo contrast.  No evidence of intra-abdominal or pelvic metastasis.  Prior right nephrectomy.  Stable left renal cyst.  Colonic diverticulosis with questionable wall thickening of the sigmoid colon may be artifactual.  Correlation to any recent colonoscopy recommended.  --09/28/2021-PET scan.  Increased size of right lower lobe lung nodule (21 x 15 mm compared to 14 x 14 mm 3 months ago) though this finding shows no significant FDG uptake.  Otherwise stable PET/CT exam.  --11/29/2021-RLL, RML wedge resections. Path consistent with metastatic renal cell carcinoma  --12/14/2021-CT chest.  Postoperative changes from wedge resections of prior right middle and lower lobe pulmonary nodules.  Stable left-sided pulmonary nodules, largest in the left upper lobe/apex measuring 8 mm.  Continued follow-up strongly recommended.  No pathologic lymphadenopathy.  --12/14/2021-CT abdomen/pelvis.  No convincing evidence of intra-abdominal or pelvic metastasis.  New diffuse abnormal gallbladder wall thickening.  Findings may be seen with cholecystitis.  Gallbladder ultrasound might be considered.  --12/20/2021-gallbladder ultrasound-diffuse gallbladder wall thickening.  Gallbladder nondistended with no visible stones.  Unlikely acute cholecystitis.  Favor chronic cholecystitis.  --03/18/2022-CT chest-patchy infiltrate compatible with right middle lobe pneumonia.  2 tiny left lung nodule stable.  --03/18/2022-CT abdomen/pelvis.  No evidence of metastatic disease to the abdomen/pelvis.  Prior right nephrectomy.  Colonic diverticulosis without evidence of  diverticulitis.  Mild splenomegaly, unchanged.  --06/24/2022- CT chest-1. The resolution of the previously seen patchy infiltrate in the right middle lobe. 2. Postsurgical changes and scarring in the right upper and lower lobe. 3. Stable small nodules in both lungs the largest one in the left upper lobe. No change.  --06/24/2022- CT abdomen/pelvis- stable.  No evidence of a neoplastic process or metastatic disease. Evidence of right nephrectomy. No focal complication. Stable left renal cyst. Diverticulosis of the colon. No evidence for diverticulitis. Stable mild splenomegaly.  Enlarged prostate.  --09/19/2022- CT CAP- Stable exam.  No change in multiple bilateral pulmonary nodules measuring up to 8 mm.  No evidence of recurrent or metastatic disease in the abdomen or pelvis status post RIGHT nephrectomy.  --1/23/2023- CT CAP- Prior wedge resection within the RML/RLL. No evidence of localized recurrence at these sites. Numerous other pulmonary nodules are present. The majority of these nodules are stable in size and appearance from the previous exam. There is one nodule which is subpleural in location within the lateral left upper lobe on image 52 which has an increased soft tissue component from the previous exam. This would warrant close follow-up on subsequent imaging.No acute abnormality of the abdomen or pelvis. Evidence of prior right nephrectomy. No focal complication. A left renal cyst.  --7/20/23- CT CAP- No change in multiple bilateral pulmonary nodules measuring up to 10 mm, compared to 01/23/2023. However, there has been gradual increase in pulmonary nodule size compared to more remote studies dating back to 03/18/2022. For reference, the dominant 10 mm LEFT upper lobe pulmonary nodule measured 7 mm on 03/18/2022. Recommend continued clinical and imaging follow-up as metastatic disease remains in the differential.  --8/17/23 and 9/5/23- Follow-up Dr. Juan A Cannon-A/P: 67-year-old male with metastatic  "ccRCC on active surveillance initially seen in [facility], 8/17/2023 for second opinion.  Discussed pathology, imaging, treatment options and prognosis in detail.  Images reviewed  tumor board that agreed with very slow-growing pulmonary nodules likely metastatic.  Discussed systemic therapy options including rechallenging IO, cabozantinib,mTOR, Belzutifan.  Clinical trials are not our institution discussed but likely will not be a candidate given his renal dysfunction.  Patient strongly prefers not to start systemic therapy unless absolutely necessary to avoid further renal dysfunction and other side effects.  Given slow growth, I think it is reasonable to hold off on systemic therapy continue surveillance for now.  Local therapy can be discussed to the dominant lesions based on future scans.  I do think he will ultimately need to get back on systemic therapy  --11/20/23- CT CAP wo- Multiple bilateral new and enlarging pulmonary nodules measuring up to 10 mm. Findings are highly concerning for metastatic disease.  Mild dilatation of the ascending artery measuring 4 cm diameter. No evidence of recurrent or metastatic disease in the abdomen or pelvis status post right nephrectomy on this unenhanced exam. Heavy sigmoid diverticulosis without evidence of diverticulitis.   --12/7/23- Follow-up Dr. Cannon: A/P: Discussed pathology, imaging, treatment options and prognosis in detail with him.  Images reviewed and agree with slow-growing pulmonary nodules that are likely metastatic.  Discussed systemic therapy options including rechallenging IO, cabozantinib, mTOR, Belzutifan, clinical trials at our institution discussed likely will not be a candidate given his renal dysfunction.  \"Both although slow, recommend cabozantinib.  Patient really concerned about side effects given his past experience with sunitinib.  I suggest start with cabozantinib 20 mg p.o. daily and titrate up to 40 mg based on tolerability.  No " dose modifications needed for renal dysfunction.  RTC as needed  --2/20/24- CT chest- Mild to moderate increase in size of several pulmonary nodules as detailed above. A few nodules are stable. Another follow-up examination in 6 months is recommended. Scattered areas of scarring in the right upper/middle and right lower lobe similar to the previous study.  --2/20/24- CT abdomen/pelvis- No acute abnormality of the abdomen or pelvis, particularly, no finding to suggest neoplastic process/metastatic disease. Diverticulosis of the colon. No evidence for diverticulitis. A stable left renal cyst. The prior right nephrectomy. No focal or regional complication.  -- 1/23/24 palliative cabozantinib was sent in but due to insurance issues did not receive the drug until 2/21/24 - stopped on 4/2/24 due to intolerance.  -- 4/2/24- CT chest-Bilateral pulmonary nodules appear relatively stable, as described above. There are no new pulmonary nodules. Prior wedge resections on the right. Mild atheromatous disease of the thoracic aorta. Coronary artery calcification is noted. Ascending thoracic aorta is dilated measuring 4 cm. Areas of ill-defined low density within the visualized liver. Difficult to tell if this represents metastatic disease or geographic fatty infiltration on the nonenhanced images. Follow-up hepatic mass protocol CT or MRI recommended.  -- 4/2/24- CT abdomen/pelvis- Large areas of low-attenuation within the liver likely representing geographic fatty infiltration. Metastatic disease is felt to be less likely but not entirely excluded as this finding is not present on 1/23/2023. Hepatic mass protocol CT or MRI is recommended to further evaluate. If the patient cannot receive IV contrast for clinical reasons, noncontrast MRI would be more beneficial. A 3 cm left renal cyst. Prior right nephrectomy. Small hiatal hernia. Diverticulosis of the colon. Under distention of stomach and colon. Normal appendix. Small  fat-containing inguinal hernias bilaterally.   -- 5/14/24- MRI wo contrast-Postoperative change of right nephrectomy without evidence of recurrent or metastatic disease in the abdomen. Mild geographic hepatic steatosis. 6 mm cyst in the pancreatic body/tail. 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 Recommend a follow-up MRI in 2 years based on ACR white paper criteria. Splenomegaly.  - 7/5/24- CT chest-Several of the pulmonary nodules are slightly larger than on the exam from April 2, 2024 but very similar in size when compared to the exam from 2/20/2024. I don't see any new pulmonary nodules.  - 7/5/24- CT abdomen/pelvis- Evidence of prior right nephrectomy. No finding to suggest local recurrence or neoplastic process or metastatic disease in the abdomen or pelvis.  Diverticulosis of the colon. No evidence for diverticulitis.  A stable 3 cm left renal cyst no change. Geographic fatty infiltration of the liver is poorly and barely visible in the present study. No discrete intrahepatic mass.   - 1/2/25- CT CAP- Mildly increased size and number of pulmonary nodules compared to 7/5/2024. Scattered coronary artery calcifications. No acute abnormality of the abdomen or pelvis, particularly, no finding to suggest neoplastic process or metastatic disease. Evidence of a prior right nephrectomy. No focal/regional complication/recurrence. A persistent moderate splenomegaly. A poorly defined low-density nodule in the spleen which was not noted in the previous study which may partly be due to lack of contrast enhancement?. This may represent a small cyst or hemangioma. Diverticulosis of the colon. No evidence for diverticulitis.      2.  Moderate calcified coronary atherosclerosis.    2.  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    --GFR 33.2 mL/min, 1/2/25 (prior:  9 - 34)  --s/p multiple doses of HD steroids in the past and remains on maintenance 2.5 mg prednisone per nephrology (Clive Ham)- tapered down from  "5 mg/2.5 mg  --will not start Cytoxan unless biopsy done to determine etiology of kidney failure. Has been off Opdivo since September 2019.    3.  Normocytic anemia.    --Resolved. Hgb 16; MCV 92.5, 1/2/25 (prior: Hgb 10.2-16.1; MCV 86.3-95.5)  4.  Gerd: modulated by omeprazole  5.  Osteopenia: on bola+vit D  6.  History of acute diverticulitis.   --04/18/2022- colonoscopy-diverticulosis in the left colon.  Nonbleeding internal hemorrhoids.  Otherwise normal exam.  Repeat 5 years.  Dr. Robison  7.  Hypertension. Cabozantinib associated.  Resolved since stopping drug last 4/2/24    Plan:  1.  Apprised of labs, 1/2/25 -normal CBC. GFR 33 (prior: 28.6), glucose 106, otherwise stable CMP. Normal TSH/T4    2.  Apprised of CT CAP, 1/2/25 (above). Mildly increased size and number of pulmonary nodules compared to 7/5/2024 otherwise BOYD    3.   Previously discussed cabozantinib tolerance: +hypertension.  Started on varying doses of Losartan on 2/26, added Coreg 6.25 bid and HCTZ in conjunction with nephrology.  According to notes from specialty pharmacy, 3/12, 3/18, 4/2/24-. Subjective BP numbers reported: 175/105, 158/98, 170/88.  Reported he felt \"clumsy, possibly dizzy/lightheaded, fatigue, constipation, diarrhea\".  No  arterial/venous thromboembolism, no bleeding, no other GI symptoms (belly pain, viscus perforation, fistula formation, severe diarrhea) no rash (hand-foot syndrome), no jaundice (hepatotoxicity), no edema (nephrotic syndrome), no jaw pains (osteonecrosis of the jaw), no decreased appetite, no nausea, no vomiting, no stomatitis, no dysphonia, no asthenia, no dysgeusia, no dyspnea, no extremity pain, no dyspepsia, no muscle spasms, no cough, no headache, no arthralgia, no xeroderma.  Discussed with Neftaly Solis PharmD on 4/2/24 who discussed with patient (face to face) on that day (see phone note):  Instructed to hold the drug for at least a week, monitor BP, use stool softeners.  Says all the symptoms have " "resolved off the drug.  We discussed resuming the drug qod schedule.  The patient wanted to stop the drug permanently (had stopped since 4/2/24).  \"It took away my QOL\".  Today he again reiterates that he does not want to get back on any type of systemic therapy.      4.   Previously reviewed follow-up Dr. Cannon ( oncology, [facility]), 12/7/23: A/P: Discussed pathology, imaging, treatment options and prognosis in detail with him.  Images reviewed and agree with slow-growing pulmonary nodules that are likely metastatic.  Discussed systemic therapy options including rechallenging IO, cabozantinib, mTOR, Belzutifan, clinical trials at our institution discussed likely will not be a candidate given his renal dysfunction.  Recommend cabozantinib.  Patient really concerned about side effects given his past experience with sunitinib.  I suggest start with cabo 20 mg p.o. daily and titrate up to 40 mg based on tolerability.  No dose modifications needed for renal dysfunction.  RTC as needed     5.   Return to office in 24 weeks (per patient request) with preoffice CT scans of the chest, abdomen/pelvis without IV contrast (in 11 weeks), CBC with differential, CMP, PO, magnesium.    I spent ~40 minutes caring for Parker on this date of service. This time includes time spent by me in the following activities: preparing for the visit, reviewing tests, performing a medically appropriate examination and/or evaluation, counseling and educating the patient/family/caregiver, ordering medications, tests, or procedures and documenting information in the medical record.       " Left upper forehead
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
## 2025-06-05 NOTE — ED PROVIDER NOTE - PATIENT PORTAL LINK FT
You can access the FollowMyHealth Patient Portal offered by [facility] by registering at the following website: [email or web address]. By joining [facility]’s FollowMyHealth portal, you will also be able to view your health information using other applications (apps) compatible with our system.

## 2025-06-10 NOTE — ED ADULT NURSE NOTE - PAIN: PRESENCE, MLM
no rashes , no jaundice present , good turgor , no masses , no tenderness on palpation complains of pain/discomfort Statement Selected

## 2025-07-03 ENCOUNTER — APPOINTMENT (OUTPATIENT)
Dept: ORTHOPEDIC SURGERY | Facility: CLINIC | Age: 55
End: 2025-07-03
Payer: OTHER MISCELLANEOUS

## 2025-07-03 PROCEDURE — 99214 OFFICE O/P EST MOD 30 MIN: CPT

## 2025-08-06 ENCOUNTER — NON-APPOINTMENT (OUTPATIENT)
Age: 55
End: 2025-08-06

## 2025-09-04 ENCOUNTER — APPOINTMENT (OUTPATIENT)
Dept: ORTHOPEDIC SURGERY | Facility: CLINIC | Age: 55
End: 2025-09-04

## 2025-09-09 ENCOUNTER — APPOINTMENT (OUTPATIENT)
Dept: ORTHOPEDIC SURGERY | Facility: CLINIC | Age: 55
End: 2025-09-09
Payer: COMMERCIAL

## 2025-09-09 VITALS — WEIGHT: 256 LBS | HEIGHT: 69 IN | BODY MASS INDEX: 37.92 KG/M2

## 2025-09-09 DIAGNOSIS — M16.12 UNILATERAL PRIMARY OSTEOARTHRITIS, LEFT HIP: ICD-10-CM

## 2025-09-09 PROCEDURE — 73502 X-RAY EXAM HIP UNI 2-3 VIEWS: CPT

## 2025-09-09 PROCEDURE — 99205 OFFICE O/P NEW HI 60 MIN: CPT

## 2025-09-09 PROCEDURE — G2211 COMPLEX E/M VISIT ADD ON: CPT | Mod: NC
